# Patient Record
Sex: FEMALE | Race: WHITE | NOT HISPANIC OR LATINO | Employment: OTHER | URBAN - METROPOLITAN AREA
[De-identification: names, ages, dates, MRNs, and addresses within clinical notes are randomized per-mention and may not be internally consistent; named-entity substitution may affect disease eponyms.]

---

## 2017-01-31 ENCOUNTER — OFFICE VISIT (OUTPATIENT)
Dept: FAMILY MEDICINE | Facility: CLINIC | Age: 47
End: 2017-01-31
Payer: COMMERCIAL

## 2017-01-31 ENCOUNTER — RADIANT APPOINTMENT (OUTPATIENT)
Dept: GENERAL RADIOLOGY | Facility: CLINIC | Age: 47
End: 2017-01-31
Attending: PHYSICIAN ASSISTANT
Payer: COMMERCIAL

## 2017-01-31 VITALS
TEMPERATURE: 97.3 F | HEART RATE: 62 BPM | OXYGEN SATURATION: 100 % | DIASTOLIC BLOOD PRESSURE: 70 MMHG | WEIGHT: 131 LBS | BODY MASS INDEX: 23.21 KG/M2 | SYSTOLIC BLOOD PRESSURE: 120 MMHG

## 2017-01-31 DIAGNOSIS — M25.511 RIGHT SHOULDER PAIN, UNSPECIFIED CHRONICITY: Primary | ICD-10-CM

## 2017-01-31 PROCEDURE — 73030 X-RAY EXAM OF SHOULDER: CPT | Mod: RT

## 2017-01-31 PROCEDURE — 99213 OFFICE O/P EST LOW 20 MIN: CPT | Performed by: PHYSICIAN ASSISTANT

## 2017-01-31 NOTE — MR AVS SNAPSHOT
After Visit Summary   1/31/2017    Henna Higginbotham    MRN: [id number]           Patient Information     Date Of Birth          1970        Visit Information        Provider Department      1/31/2017 10:10 AM Bridget Alejandra PA-C [facility]        Today's Diagnoses     Right shoulder pain, unspecified chronicity    -  1       Care Instructions    Rest and ice the right shoulder multiple times daily for the next 2-3 days.     Perform gentle range of motion to prevent stiffness.     Then, slowly increase activities as tolerated. If something causes you pain, you are doing too much.    Alternate motrin and tylenol as needed for discomfort.    Follow up with physical therapy for further evaluation and treatment.     Schedule your MRI if the pain is persisting. If MRI shows a cause for your pain or your pain worsens, I will refer you to orthopedics.             Follow-ups after your visit        Additional Services     [facility] PT, HAND, AND CHIROPRACTIC REFERRAL       **This order will print in the [facility] Scheduling Office**    Physical Therapy, Hand Therapy and Chiropractic Care are available through:    *[facility]  *[facility]  *[facility]    Call one number to schedule at any of the above locations: [phone].    Your provider has referred you to: Physical Therapy at [facility] or [facility]    Indication/Reason for Referral: Shoulder Pain  Onset of Illness: 4-5 months  Therapy Orders: Evaluate and Treat  Special Programs: None  Special Request: None    Wan Blas      Additional Comments for the Therapist or Chiropractor: none    Please be aware that coverage of these services is subject to the terms and limitations of your health insurance plan.  Call member services at your health plan with any benefit or coverage questions.      Please bring the following to your appointment:    *Your personal calendar for scheduling future  "appointments  *Comfortable clothing                  Future tests that were ordered for you today     Open Future Orders        Priority Expected Expires Ordered    MR Shoulder Right w/o Contrast Routine  2018            Who to contact     If you have questions or need follow up information about today's clinic visit or your schedule please contact [facility] directly at [phone].  Normal or non-critical lab and imaging results will be communicated to you by MyChart, letter or phone within 4 business days after the clinic has received the results. If you do not hear from us within 7 days, please contact the clinic through [facility] or phone. If you have a critical or abnormal lab result, we will notify you by phone as soon as possible.  Submit refill requests through [employer] or call your pharmacy and they will forward the refill request to us. Please allow 3 business days for your refill to be completed.          Additional Information About Your Visit        Panda[employer] Information     [facility] lets you send messages to your doctor, view your test results, renew your prescriptions, schedule appointments and more. To sign up, go to [email or web address] . Click on \"Log in\" on the left side of the screen, which will take you to the Welcome page. Then click on \"Sign up Now\" on the right side of the page.     You will be asked to enter the access code listed below, as well as some personal information. Please follow the directions to create your username and password.     Your access code is: [email or web address]  Expires: 2017 10:39 AM     Your access code will  in 90 days. If you need help or a new code, please call your [facility] or [phone].        Care EveryWhere ID     This is your Care EveryWhere ID. This could be used by other organizations to access your [facility] medical records  [id number]        Your Vitals Were     Pulse Temperature Pulse Oximetry             62 " 97.3  F (36.3  C) (Oral) 100%          Blood Pressure from Last 3 Encounters:   01/31/17 120/70   12/19/16 116/70   12/01/14 110/69    Weight from Last 3 Encounters:   01/31/17 131 lb (59.421 kg)   12/19/16 132 lb 3.2 oz (59.966 kg)   12/01/14 126 lb (57.153 kg)              We Performed the Following     [facility] PT, HAND, AND CHIROPRACTIC REFERRAL     XR Shoulder Right G/E 3 Views        Primary Care Provider Office Phone # Fax #    Ra Mack [phone]       [facility] [address] [facility] [address]        Thank you!     Thank you for choosing [facility]  for your care. Our goal is always to provide you with excellent care. Hearing back from our patients is one way we can continue to improve our services. Please take a few minutes to complete the written survey that you may receive in the mail after your visit with us. Thank you!             Your Updated Medication List - Protect others around you: Learn how to safely use, store and throw away your medicines at www.disposemymeds.org.          This list is accurate as of: 1/31/17 10:39 AM.  Always use your most recent med list.                   Brand Name Dispense Instructions for use    ALPRAZolam 0.5 MG tablet    XANAX    30 tablet    Take 1 tablet (0.5 mg) by mouth 3 times daily as needed for anxiety       levonorgestrel 20 MCG/24HR IUD    MIRENA     1 each by Intrauterine route once       phenazopyridine 200 MG tablet    PYRIDIUM    6 tablet    Take 1 tablet (200 mg) by mouth 3 times daily as needed       rizatriptan 10 MG tablet    MAXALT    30 tablet    Take 1 tablet (10 mg) by mouth at onset of headache for migraine MAY REPEAT IN 2 HOURS IF NEEDED       sulfamethoxazole-trimethoprim 800-160 MG per tablet    BACTRIM DS    14 tablet    Take 1 tablet by mouth 2 times daily For bladder infections

## 2017-01-31 NOTE — PROGRESS NOTES
SUBJECTIVE:                                                    Henna Higginbotham is a 46 year old female who presents to clinic today for the following health issues:    Shoulder Pain     Onset: 4- 5 months     Description:   Location: right shoulder  Character: Sharp pain with movement, painful ache.     Intensity: moderate    Progression of Symptoms: worse    Accompanying Signs & Symptoms:  Other symptoms: Unable to lift without pain, ex. Brushing/blow drying hair, lifting coffee cup.   History:   Previous similar pain: no       Precipitating factors:   Trauma or overuse: no     Alleviating factors:  Improved by: nothing       Therapies Tried and outcome: Rest, stretching, chiropractor.       Pain has slowly progressed. She did take 6-8 weeks off of lifting weights. This did not improve her symptoms. She has tried stretching and muscle stimulation. Locates pain within the joint. Most pain with abduction or any range of motion above her head. She has good range of motion. Denies numbness and tingling. She is right hand dominant.     Problem list and histories reviewed & adjusted, as indicated.  Additional history: as documented    Patient Active Problem List   Diagnosis     Anxiety state     CARDIOVASCULAR SCREENING; LDL GOAL LESS THAN 160     Migraines     Past Surgical History   Procedure Laterality Date     Hc enlarge breast with implant  2004       Social History   Substance Use Topics     Smoking status: Never Smoker      Smokeless tobacco: Never Used     Alcohol Use: Yes      Comment: occ.     Family History   Problem Relation Age of Onset     Breast Cancer Mother      CANCER Mother      skin     CANCER Father      kidney     Hypertension Father      Lipids Father      CANCER Maternal Grandmother      ovarian     DIABETES Maternal Grandfather      CANCER Maternal Grandfather      skin     Arthritis Brother      rheumatoid         Current Outpatient Prescriptions   Medication Sig Dispense Refill      phenazopyridine (PYRIDIUM) 200 MG tablet Take 1 tablet (200 mg) by mouth 3 times daily as needed 6 tablet 11     rizatriptan (MAXALT) 10 MG tablet Take 1 tablet (10 mg) by mouth at onset of headache for migraine MAY REPEAT IN 2 HOURS IF NEEDED 30 tablet 0     ALPRAZolam (XANAX) 0.5 MG tablet Take 1 tablet (0.5 mg) by mouth 3 times daily as needed for anxiety 30 tablet 3     sulfamethoxazole-trimethoprim (BACTRIM DS) 800-160 MG per tablet Take 1 tablet by mouth 2 times daily For bladder infections 14 tablet 11     levonorgestrel (MIRENA) 20 MCG/24HR IUD 1 each by Intrauterine route once       No Known Allergies  BP Readings from Last 3 Encounters:   01/31/17 120/70   12/19/16 116/70   12/01/14 110/69    Wt Readings from Last 3 Encounters:   01/31/17 131 lb (59.421 kg)   12/19/16 132 lb 3.2 oz (59.966 kg)   12/01/14 126 lb (57.153 kg)                  Problem list, Medication list, Allergies, and Medical/Social/Surgical histories reviewed in [facility] and updated as appropriate.    ROS:  Constitutional, musculoskeletal and integumentary systems are negative, except as otherwise noted.    OBJECTIVE:                                                    /70 mmHg  Pulse 62  Temp(Src) 97.3  F (36.3  C) (Oral)  Wt 131 lb (59.421 kg)  SpO2 100%  Body mass index is 23.21 kg/(m^2).  GENERAL: healthy, alert and no distress  MS: Right shoulder -no tenderness to palpation, normal range of motion with mild pain especially with flexion and abduction past 90 degrees, no swelling, no skin changes, normal sensation and capillary refill  SKIN: no suspicious lesions or rashes    Diagnostic Test Results:  Xray of right shoulder- unremarkable. Radiology read is pending     ASSESSMENT/PLAN:                                                        ICD-10-CM    1. Right shoulder pain, unspecified chronicity M25.511 XR Shoulder Right G/E 3 Views     MR Shoulder Right w/o Contrast     [facility] PT, HAND, AND CHIROPRACTIC REFERRAL     CANCELED: XR  Shoulder Right 2 Views     Concerns for strain vs partial tear of rotator cuff. Will order physical therapy and MRI if pain is not improving.     Patient Instructions   Rest and ice the right shoulder multiple times daily for the next 2-3 days.     Perform gentle range of motion to prevent stiffness.     Then, slowly increase activities as tolerated. If something causes you pain, you are doing too much.    Alternate motrin and tylenol as needed for discomfort.    Follow up with physical therapy for further evaluation and treatment.     Schedule your MRI if the pain is persisting. If MRI shows a cause for your pain or your pain worsens, I will refer you to orthopedics.             Bridget Alejandra PA-C  [facility]

## 2017-01-31 NOTE — PATIENT INSTRUCTIONS
Rest and ice the right shoulder multiple times daily for the next 2-3 days.     Perform gentle range of motion to prevent stiffness.     Then, slowly increase activities as tolerated. If something causes you pain, you are doing too much.    Alternate motrin and tylenol as needed for discomfort.    Follow up with physical therapy for further evaluation and treatment.     Schedule your MRI if the pain is persisting. If MRI shows a cause for your pain or your pain worsens, I will refer you to orthopedics.

## 2017-01-31 NOTE — Clinical Note
[facility]  [address] [facility] [address] [phone]        January 31, 2017    Henna Higginbotham  [address] [address] [facility] [address]            Dear Henna,    The results of your recent tests were normal.  Below is a copy of the results.  It was a pleasure to see you at your last appointment.    If you have any questions or concerns, please call myself or my nurse at [phone].    Sincerely,    Bridget Alejandra PA-C/catalino    Results for orders placed or performed in visit on 01/31/17   XR Shoulder Right G/E 3 Views    Narrative    RIGHT SHOULDER 3 VIEWS  1/31/2017 10:35 AM    HISTORY:  Pain in right shoulder.    COMPARISON:  None.    FINDINGS:  No fracture or osseous lesion is seen. The joint spaces are  well preserved. No adjacent soft tissue pathology is seen.      Impression    IMPRESSION:  Unremarkable examination.    BIB ONOFRE MD

## 2017-01-31 NOTE — NURSING NOTE
"Chief Complaint   Patient presents with     Shoulder Pain       Mask not indicated for this appointment.     Initial /70 mmHg  Pulse 62  Temp(Src) 97.3  F (36.3  C) (Oral)  Wt 131 lb (59.421 kg)  SpO2 100% Estimated body mass index is 23.21 kg/(m^2) as calculated from the following:    Height as of 12/19/16: 5' 3\" (1.6 m).    Weight as of this encounter: 131 lb (59.421 kg)..  BP completed using cuff size: carlos enrique Magallanes MA    "

## 2017-02-04 ENCOUNTER — THERAPY VISIT (OUTPATIENT)
Dept: PHYSICAL THERAPY | Facility: CLINIC | Age: 47
End: 2017-02-04
Payer: COMMERCIAL

## 2017-02-04 DIAGNOSIS — G89.29 CHRONIC RIGHT SHOULDER PAIN: Primary | ICD-10-CM

## 2017-02-04 DIAGNOSIS — M25.511 CHRONIC RIGHT SHOULDER PAIN: Primary | ICD-10-CM

## 2017-02-04 PROCEDURE — 97110 THERAPEUTIC EXERCISES: CPT | Mod: GP | Performed by: PHYSICAL THERAPIST

## 2017-02-04 PROCEDURE — 97161 PT EVAL LOW COMPLEX 20 MIN: CPT | Mod: GP | Performed by: PHYSICAL THERAPIST

## 2017-02-04 PROCEDURE — 97112 NEUROMUSCULAR REEDUCATION: CPT | Mod: GP | Performed by: PHYSICAL THERAPIST

## 2017-02-04 NOTE — PROGRESS NOTES
[facility] Initial Evaluation    Subjective:    Henna Higginbotham is a 46 year old female with a right shoulder condition.  Condition occurred with:  Unknown cause.  Condition occurred: for unknown reasons.  This is a new condition  Sept 2016  Had been strength training 2 x week but no longer doing upper extremity strengthening. .    Patient reports pain:  In the joint.    Pain is described as aching and sharp and is intermittent Pain Scale: 2-6/10.   Pain is the same all the time.  Symptoms are exacerbated by using arm overhead, lying on extremity, lifting, using arm at shoulder level and using arm behind back Relieved by: avoid what hurts.  Since onset symptoms are gradually worsening.  Special testing: MRI scheduled next weekend.  Previous treatment includes chiropractic.  There was no improvement following previous treatment.  General health as reported by patient is excellent.  Pertinent medical history includes:  Migraines.  Medical allergies: no.  Other surgeries include:  None reported.  Current medications:  None as reported by the patient.  Current occupation is executive.  Patient is working in normal job without restrictions.  Primary job tasks include:  Prolonged sitting (computer work).    Barriers include:  None as reported by the patient.    Red flags:  None as reported by the patient.                      Objective:    Standing Alignment:      Shoulder/UE:  Rounded shoulders, protracted scapula L and protracted scapula R                  Flexibility/Screens:   Positive screens:  ShoulderNegative screens: Cervical   Upper Extremity:    Decreased left upper extremity flexibility at:  Pectoralis Major and Pectoralis Minor    Decreased right upper extremity flexibility present at:  Pectoralis Major and Pectoralis Minor                           Shoulder Evaluation:  ROM:  AROM:    Flexion:  Left:  155    Right:  155    Abduction:  Left: 160   Right:   170                Flexion/External Rotation:  Left:  T3    Right:  T3  Extension/Internal Rotation:  Left:  T3    Right:  T6    PROM:    Flexion:  Right: 160+      Abduction:  Right:  170+    Internal Rotation:  Right:  60 sidely  External Rotation:  Right:  90                    Strength:  normal (Fair scapular stability)                        Stability Testing:        Right shoulder stability negative testing:  Apprehension and Relocation  Special Tests:  Special tests assessed shoulder: +Nelson's RIght.    Right shoulder positive for the following special tests:Labral and Impingement  Right shoulder negative for the following special tests:Bursal; Neural Tension; Rotator cuff tear and Acrimioclavicular  Palpation:  normal      Mobility Tests:  normal                                                   General     ROS    Assessment/Plan:      Patient is a 46 year old female with right side shoulder complaints.    Patient has the following significant findings with corresponding treatment plan.                  Diagnosis 1:  Right Shoulder Impingement (suspect labral tear)      ain -  manual therapy, self management, education, home program and modalities PRN  Decreased strength - therapeutic exercise, therapeutic activities and home program  Decreased proprioception - neuro re-education and therapeutic activities  Impaired muscle performance - neuro re-education and home program  Decreased function - therapeutic activities and home program  Impaired posture - neuro re-education, therapeutic activities and home program    Therapy Evaluation Codes:   1) History comprised of:   Personal factors that impact the plan of care:      None.    Comorbidity factors that impact the plan of care are:      None.     Medications impacting care: None.  2) Examination of Body Systems comprised of:   Body structures and functions that impact the plan of care:      Shoulder.   Activity limitations that impact the plan of care are:       Bathing, Dressing, Lifting, Sports, Throwing and Sleeping.  3) Clinical presentation characteristics are:   Stable/Uncomplicated.  4) Decision-Making    Low complexity using standardized patient assessment instrument and/or measureable assessment of functional outcome.  Cumulative Therapy Evaluation is: Low complexity.    Previous and current functional limitations:  (See Goal Flow Sheet for this information)    Short term and Long term goals: (See Goal Flow Sheet for this information)     Communication ability:  Patient appears to be able to clearly communicate and understand verbal and written communication and follow directions correctly.  Treatment Explanation - The following has been discussed with the patient:   RX ordered/plan of care  Anticipated outcomes  Possible risks and side effects  This patient would benefit from PT intervention to resume normal activities.   Rehab potential is good.    Frequency:  1 X week, once daily  Duration:  for 6 weeks  Discharge Plan:  Achieve all LTG.  Independent in home treatment program.  Return to previous functional level by discharge.  Reach maximal therapeutic benefit.    Please refer to the daily flowsheet for treatment today, total treatment time and time spent performing 1:1 timed codes.

## 2017-02-11 ENCOUNTER — RADIANT APPOINTMENT (OUTPATIENT)
Dept: MRI IMAGING | Facility: CLINIC | Age: 47
End: 2017-02-11
Attending: PHYSICIAN ASSISTANT
Payer: COMMERCIAL

## 2017-02-11 DIAGNOSIS — M25.511 RIGHT SHOULDER PAIN, UNSPECIFIED CHRONICITY: ICD-10-CM

## 2017-02-11 PROCEDURE — 73221 MRI JOINT UPR EXTREM W/O DYE: CPT | Mod: TC

## 2017-02-13 ENCOUNTER — THERAPY VISIT (OUTPATIENT)
Dept: PHYSICAL THERAPY | Facility: CLINIC | Age: 47
End: 2017-02-13

## 2017-02-13 DIAGNOSIS — G89.29 CHRONIC RIGHT SHOULDER PAIN: ICD-10-CM

## 2017-02-13 DIAGNOSIS — M25.511 CHRONIC RIGHT SHOULDER PAIN: ICD-10-CM

## 2017-02-17 ENCOUNTER — TELEPHONE (OUTPATIENT)
Dept: FAMILY MEDICINE | Facility: CLINIC | Age: 47
End: 2017-02-17

## 2017-02-17 DIAGNOSIS — M25.511 RIGHT SHOULDER PAIN, UNSPECIFIED CHRONICITY: Primary | ICD-10-CM

## 2017-02-17 NOTE — TELEPHONE ENCOUNTER
Henna,   Attached are the results from your right shoulder MRI. There is no tear of the rotator cuff. There is mild tendinosis, chronic form of tendonitis without the inflammation, of the supraspinatus tendon. This is most likely due to overuse and is likely causing your symptoms. There is also mild subacromial edema, which could be related to a mild bursitis. I would continue with physical therapy. If you would like to see an orthopedist, I would be happy to place the referral. Please contact the clinic if you have any questions. Thank you.     Brigdet Alejandra PA-C       Left message on answering machine for patient to call back.  Christi TRUJILLON, RN, CPN

## 2017-02-17 NOTE — TELEPHONE ENCOUNTER
Patient informed of result note as below. Patient verbalized understanding. Patient would like referral to ortho. Patient would also like results mailed to her. .Christi TRUJILLON, RN, CPN

## 2017-02-17 NOTE — TELEPHONE ENCOUNTER
LM for pt letting her know scheduling number for referral as well as let her know a copy of her results and referral mailed to her address.    Janice Dye,

## 2017-02-17 NOTE — TELEPHONE ENCOUNTER
Reason for Call:  Request for results:    Name of test or procedure: MRI    Date of test of procedure: 02/11/2017    Location of the test or procedure: [facility] to leave the result message on voice mail or with a family member? YES    Phone number Patient can be reached at:  Home number on file [phone] (home)    Additional comments:     Call taken on 2/17/2017 at 10:23 AM by Carmen Levy

## 2017-02-17 NOTE — LETTER
[facility]  [address] [facility] [address] [phone]        February 17, 2017    Henna Higginbotham  [address] [facility] [address]            Dear Henna,    Kira are the results from your right shoulder MRI. There is no tear of the rotator cuff. There is mild tendinosis, chronic form of tendonitis without the inflammation, of the supraspinatus tendon. This is most likely due to overuse and is likely causing your symptoms. There is also mild subacromial edema, which could be related to a mild bursitis. I would continue with physical therapy. If you would like to see an orthopedist, I would be happy to place the referral. Please contact the clinic if you have any questions. Thank you. It was a pleasure to see you at your last appointment.    If you have any questions or concerns, please call myself or my nurse at [phone].    Sincerely,    Bridget Alejandra PA-C/ks    Results for orders placed or performed in visit on 02/11/17   MR Shoulder Right w/o Contrast    Narrative    MR RIGHT SHOULDER WITHOUT CONTRAST  2/11/2017 9:07 AM    HISTORY:  Right shoulder pain.    TECHNIQUE:  Coronal oblique T1, T2, and fat suppressed T2, sagittal  oblique T2, and transverse proton density and T2 weighted images.    COMPARISON: X-ray from 1/31/2017.    FINDINGS:   Osseous acromial outlet: There is a Type I subacromial  configuration.No anterior or lateral downsloping.  No subacromial  spur.  Acromioclavicular joint is normal and does not narrow the  supraspinatus outlet.    Rotator cuff: Mild tendinosis distal supraspinatus tendon. No evidence  of rotator cuff tear.  No focal muscle atrophy.    Labral Structures: The glenoid labrum appears within normal limits. No  apparent SLAP lesion. No paralabral cysts.    Biceps Tendon: No long head biceps tendon tear, subluxation, or  tendinosis.    Osseous structures: Mild resorptive cysts greater tuberosity region.   Bone marrow signal is otherwise  normal.  No apparent chondromalacia.    Joint space: No glenohumeral joint effusion.    Additional findings: Small amount of subacromial edema.  No deltoid  muscle edema or tear.      Impression    IMPRESSION:   1. Mild tendinosis distal supraspinatus tendon.  2. Mild subacromial edema could be related to mild subacromial  bursitis.    JENNIFER BARAJAS MD

## 2017-02-17 NOTE — TELEPHONE ENCOUNTER
Referral pended, please complete and sign route back to [facility] for TC to mail results and referral info.    Janice Dye,

## 2017-03-21 PROBLEM — M25.511 CHRONIC RIGHT SHOULDER PAIN: Status: RESOLVED | Noted: 2017-02-04 | Resolved: 2017-03-21

## 2017-03-21 PROBLEM — G89.29 CHRONIC RIGHT SHOULDER PAIN: Status: RESOLVED | Noted: 2017-02-04 | Resolved: 2017-03-21

## 2017-04-06 ENCOUNTER — OFFICE VISIT (OUTPATIENT)
Dept: FAMILY MEDICINE | Facility: CLINIC | Age: 47
End: 2017-04-06
Payer: COMMERCIAL

## 2017-04-06 VITALS
BODY MASS INDEX: 23.03 KG/M2 | OXYGEN SATURATION: 100 % | HEART RATE: 95 BPM | WEIGHT: 130 LBS | SYSTOLIC BLOOD PRESSURE: 129 MMHG | DIASTOLIC BLOOD PRESSURE: 81 MMHG

## 2017-04-06 DIAGNOSIS — F41.1 ANXIETY STATE: Primary | ICD-10-CM

## 2017-04-06 DIAGNOSIS — R30.0 DYSURIA: ICD-10-CM

## 2017-04-06 PROCEDURE — 99213 OFFICE O/P EST LOW 20 MIN: CPT | Performed by: FAMILY MEDICINE

## 2017-04-06 RX ORDER — PHENAZOPYRIDINE HYDROCHLORIDE 200 MG/1
200 TABLET, FILM COATED ORAL 3 TIMES DAILY PRN
Qty: 6 TABLET | Refills: 11 | Status: SHIPPED | OUTPATIENT
Start: 2017-04-06 | End: 2018-01-04

## 2017-04-06 RX ORDER — ALPRAZOLAM 0.5 MG
0.5 TABLET ORAL 3 TIMES DAILY PRN
Qty: 30 TABLET | Refills: 5 | Status: SHIPPED | OUTPATIENT
Start: 2017-04-06 | End: 2018-01-04

## 2017-04-06 RX ORDER — SULFAMETHOXAZOLE AND TRIMETHOPRIM 400; 80 MG/1; MG/1
1 TABLET ORAL 2 TIMES DAILY
Qty: 20 TABLET | Refills: 11 | Status: SHIPPED | OUTPATIENT
Start: 2017-04-06 | End: 2018-01-04

## 2017-04-06 NOTE — MR AVS SNAPSHOT
"              After Visit Summary   2017    Henna Higginbotham    MRN: [id number]           Patient Information     Date Of Birth          1970        Visit Information        Provider Department      2017 1:30 PM Ra Mack MD [facility]        Today's [facility]     Anxiety state    -  1    Dysuria           Follow-ups after your visit        Who to contact     If you have questions or need follow up information about today's clinic visit or your schedule please contact [facility] directly at [phone].  Normal or non-critical lab and imaging results will be communicated to you by MyChart, letter or phone within 4 business days after the clinic has received the results. If you do not hear from us within 7 days, please contact the clinic through [employer] or phone. If you have a critical or abnormal lab result, we will notify you by phone as soon as possible.  Submit refill requests through [employer] or call your pharmacy and they will forward the refill request to us. Please allow 3 business days for your refill to be completed.          Additional Information About Your Visit        MyChart Information     [employer] lets you send messages to your doctor, view your test results, renew your prescriptions, schedule appointments and more. To sign up, go to [email or web address] . Click on \"Log in\" on the left side of the screen, which will take you to the Welcome page. Then click on \"Sign up Now\" on the right side of the page.     You will be asked to enter the access code listed below, as well as some personal information. Please follow the directions to create your username and password.     Your access code is: [email or web address]  Expires: 2017 11:39 AM     Your access code will  in 90 days. If you need help or a new code, please call your [facility] or [phone].        Care EveryWhere ID     This is your Care EveryWhere ID. This could be used by other " organizations to access your [place] medical records  [id number]        Your Vitals Were     Pulse Pulse Oximetry BMI (Body Mass Index)             95 100% 23.03 kg/m2          Blood Pressure from Last 3 Encounters:   04/06/17 129/81   01/31/17 120/70   12/19/16 116/70    Weight from Last 3 Encounters:   04/06/17 130 lb (59 kg)   01/31/17 131 lb (59.4 kg)   12/19/16 132 lb 3.2 oz (60 kg)              Today, you had the following     No orders found for display         Today's Medication Changes          These changes are accurate as of: 4/6/17  2:01 PM.  If you have any questions, ask your nurse or doctor.               These medicines have changed or have updated prescriptions.        Dose/Directions    * sulfamethoxazole-trimethoprim 800-160 MG per tablet   Commonly known as:  BACTRIM DS   This may have changed:  Another medication with the same name was added. Make sure you understand how and when to take each.   Used for:  Dysuria   Changed by:  Ra Mack MD        Dose:  1 tablet   Take 1 tablet by mouth 2 times daily For bladder infections   Quantity:  14 tablet   Refills:  11       * sulfamethoxazole-trimethoprim 400-80 MG per tablet   Commonly known as:  BACTRIM/SEPTRA   This may have changed:  You were already taking a medication with the same name, and this prescription was added. Make sure you understand how and when to take each.   Used for:  Dysuria   Changed by:  Ra Mack MD        Dose:  1 tablet   Take 1 tablet by mouth 2 times daily   Quantity:  20 tablet   Refills:  11       * Notice:  This list has 2 medication(s) that are the same as other medications prescribed for you. Read the directions carefully, and ask your doctor or other care provider to review them with you.         Where to get your medicines      These medications were sent to [facility]/pharmacy #[id number] - [facility] [address] [place] [address],  AT CORNER OF [facility]  [address] [place] [address], ,  [facility] [address]     Phone:  [phone]     phenazopyridine 200 MG tablet    sulfamethoxazole-trimethoprim 400-80 MG per tablet         Some of these will need a paper prescription and others can be bought over the counter.  Ask your nurse if you have questions.     Bring a paper prescription for each of these medications     ALPRAZolam 0.5 MG tablet                Primary Care Provider Office Phone # Fax #    Ra Mack [phone]       [facility] [address] [address] [facility] [address]        Thank you!     Thank you for choosing [facility]  for your care. Our goal is always to provide you with excellent care. Hearing back from our patients is one way we can continue to improve our services. Please take a few minutes to complete the written survey that you may receive in the mail after your visit with us. Thank you!             Your Updated Medication List - Protect others around you: Learn how to safely use, store and throw away your medicines at www.disposemymeds.org.          This list is accurate as of: 4/6/17  2:01 PM.  Always use your most recent med list.                   Brand Name Dispense Instructions for use    ALPRAZolam 0.5 MG tablet    XANAX    30 tablet    Take 1 tablet (0.5 mg) by mouth 3 times daily as needed for anxiety       levonorgestrel 20 MCG/24HR IUD    MIRENA     1 each by Intrauterine route once       phenazopyridine 200 MG tablet    PYRIDIUM    6 tablet    Take 1 tablet (200 mg) by mouth 3 times daily as needed       rizatriptan 10 MG tablet    MAXALT    30 tablet    Take 1 tablet (10 mg) by mouth at onset of headache for migraine MAY REPEAT IN 2 HOURS IF NEEDED       * sulfamethoxazole-trimethoprim 800-160 MG per tablet    BACTRIM DS    14 tablet    Take 1 tablet by mouth 2 times daily For bladder infections       * sulfamethoxazole-trimethoprim 400-80 MG per tablet    BACTRIM/SEPTRA    20 tablet    Take 1 tablet by mouth 2 times  daily       * Notice:  This list has 2 medication(s) that are the same as other medications prescribed for you. Read the directions carefully, and ask your doctor or other care provider to review them with you.

## 2017-04-06 NOTE — PROGRESS NOTES
SUBJECTIVE:  SUBJECTIVE:  47 year old.The patient has a history of  follow-up of anxiety symptoms.    Since last visit the patient has changing jobs and will be unemployed for 6 months.  Current symptoms include: Anxiety   Symptoms that have subjectively improved include: Anxiety  Previous and current treatment modalities employed include: Medications   xanax  Patient Active Problem List   Diagnosis     Anxiety state     CARDIOVASCULAR SCREENING; LDL GOAL LESS THAN 160     Migraines      Reviewed health maintenance  OBJECTIVE:  no apparent distress  /81  Pulse 95  Wt 130 lb (59 kg)  SpO2 100%  BMI 23.03 kg/m2       MENTAL STATUS EXAM:   1. Clinical observations:Patient was clean and was adequately groomed. Patient's emotional presentation was cooperative and tamra/open. Patient spoke clearly and articulately. Patient maintained adequate eye contact and was cooperative in answering questions.  2. Patient appeared to be well-oriented in all spheres with coherent, logical, goal directed and relevent thinking.  3. Thought content: Denies auditory and visual hallucinations or delusions.  4. Affect and mood: Affect is agitated and her emotional attitude is described as normal.  5. Sensorium and cognition: Patient was in contact with reality and oriented to place, time, person and situation. patient demonstrated no impairment in immediate, recent, or remote memory. patient's insight was adequate without obvious deficits in intelligence.        SUBJECTIVE: patient has history of recurrent bladder infections especially when she travels    ICD-10-CM    1. Anxiety state F41.1    2. Dysuria R30.0 ALPRAZolam (XANAX) 0.5 MG tablet     phenazopyridine (PYRIDIUM) 200 MG tablet     sulfamethoxazole-trimethoprim (BACTRIM/SEPTRA) 400-80 MG per tablet    PLAN:Follow up in 6 months       :

## 2017-06-07 ENCOUNTER — TELEPHONE (OUTPATIENT)
Dept: FAMILY MEDICINE | Facility: CLINIC | Age: 47
End: 2017-06-07

## 2017-06-07 NOTE — TELEPHONE ENCOUNTER
Reason for call:  Other     Patient called regarding  immunizations    Additional comments: wants to know if immunizations are up to date      Phone number to reach patient:  Home number on file [phone] (home)    Best Time:        Can we leave a detailed message on this number?  YES

## 2017-06-07 NOTE — TELEPHONE ENCOUNTER
Patient is due for a Tdap.  Please call patient and help schedule an ancillary appointment for this if interested.   Tone Johnson CMA

## 2017-06-08 ENCOUNTER — ALLIED HEALTH/NURSE VISIT (OUTPATIENT)
Dept: NURSING | Facility: CLINIC | Age: 47
End: 2017-06-08
Payer: COMMERCIAL

## 2017-06-08 DIAGNOSIS — Z23 NEED FOR VACCINATION: Primary | ICD-10-CM

## 2017-06-08 PROCEDURE — 99207 ZZC NO CHARGE NURSE ONLY: CPT

## 2017-06-08 PROCEDURE — 90715 TDAP VACCINE 7 YRS/> IM: CPT

## 2017-06-08 PROCEDURE — 90471 IMMUNIZATION ADMIN: CPT

## 2017-06-08 NOTE — NURSING NOTE
Screening Questionnaire for Adult Immunization    Are you sick today?   No   Do you have allergies to medications, food, a vaccine component or latex?   No   Have you ever had a serious reaction after receiving a vaccination?   No   Do you have a long-term health problem with heart disease, lung disease, asthma, kidney disease, metabolic disease (e.g. diabetes), anemia, or other blood disorder?   No   Do you have cancer, leukemia, HIV/AIDS, or any other immune system problem?   No   In the past 3 months, have you taken medications that affect  your immune system, such as prednisone, other steroids, or anticancer drugs; drugs for the treatment of rheumatoid arthritis, Crohn s disease, or psoriasis; or have you had radiation treatments?   No   Have you had a seizure, or a brain or other nervous system problem?   No   During the past year, have you received a transfusion of blood or blood     products, or been given immune (gamma) globulin or antiviral drug?   No   For women: Are you pregnant or is there a chance you could become        pregnant during the next month?   No   Have you received any vaccinations in the past 4 weeks?   No     Immunization questionnaire answers were all negative.      MNVFC doesn't apply on this patient    Per orders of Dr. DODD, injection of Tdap given by Priti Tompkins. Patient instructed to remain in clinic for 20 minutes afterwards, and to report any adverse reaction to me immediately.       Screening performed by Priti Tompkins on 6/8/2017 at 8:58 AM.

## 2017-06-08 NOTE — MR AVS SNAPSHOT
"              After Visit Summary   2017    Henna Higginbotham    MRN: [id number]           Patient Information     Date Of Birth          1970        Visit Information        Provider Department      2017 8:30 AM AN ANCILLARY [facility]        Today's Diagnoses     Need for vaccination    -  1       Follow-ups after your visit        Who to contact     If you have questions or need follow up information about today's clinic visit or your schedule please contact [facility] directly at [phone].  Normal or non-critical lab and imaging results will be communicated to you by MyChart, letter or phone within 4 business days after the clinic has received the results. If you do not hear from us within 7 days, please contact the clinic through U[employer] or phone. If you have a critical or abnormal lab result, we will notify you by phone as soon as possible.  Submit refill requests through [employer] or call your pharmacy and they will forward the refill request to us. Please allow 3 business days for your refill to be completed.          Additional Information About Your Visit        MyChart Information     [employer] lets you send messages to your doctor, view your test results, renew your prescriptions, schedule appointments and more. To sign up, go to [email or web address] . Click on \"Log in\" on the left side of the screen, which will take you to the Welcome page. Then click on \"Sign up Now\" on the right side of the page.     You will be asked to enter the access code listed below, as well as some personal information. Please follow the directions to create your username and password.     Your access code is: [email or web address]  Expires: 2017  9:01 AM     Your access code will  in 90 days. If you need help or a new code, please call your [facility] or [phone].        Care EveryWhere ID     This is your Care EveryWhere ID. This could be used by other organizations to " access your [place] medical records  [id number]         Blood Pressure from Last 3 Encounters:   04/06/17 129/81   01/31/17 120/70   12/19/16 116/70    Weight from Last 3 Encounters:   04/06/17 130 lb (59 kg)   01/31/17 131 lb (59.4 kg)   12/19/16 132 lb 3.2 oz (60 kg)              We Performed the Following     1st  Administration  [90789]     TDAP VACCINE (ADACEL) [76465.002]        Primary Care Provider Office Phone # Fax #    Ra Chuy Mack [phone]       [facility] [address] [facility] [address]        Thank you!     Thank you for choosing [facility]  for your care. Our goal is always to provide you with excellent care. Hearing back from our patients is one way we can continue to improve our services. Please take a few minutes to complete the written survey that you may receive in the mail after your visit with us. Thank you!             Your Updated Medication List - Protect others around you: Learn how to safely use, store and throw away your medicines at www.disposemymeds.org.          This list is accurate as of: 6/8/17  9:01 AM.  Always use your most recent med list.                   Brand Name Dispense Instructions for use    ALPRAZolam 0.5 MG tablet    XANAX    30 tablet    Take 1 tablet (0.5 mg) by mouth 3 times daily as needed for anxiety       levonorgestrel 20 MCG/24HR IUD    MIRENA     1 each by Intrauterine route once       phenazopyridine 200 MG tablet    PYRIDIUM    6 tablet    Take 1 tablet (200 mg) by mouth 3 times daily as needed       rizatriptan 10 MG tablet    MAXALT    30 tablet    Take 1 tablet (10 mg) by mouth at onset of headache for migraine MAY REPEAT IN 2 HOURS IF NEEDED       * sulfamethoxazole-trimethoprim 800-160 MG per tablet    BACTRIM DS    14 tablet    Take 1 tablet by mouth 2 times daily For bladder infections       * sulfamethoxazole-trimethoprim 400-80 MG per tablet    BACTRIM/SEPTRA    20 tablet    Take 1  tablet by mouth 2 times daily       * Notice:  This list has 2 medication(s) that are the same as other medications prescribed for you. Read the directions carefully, and ask your doctor or other care provider to review them with you.

## 2017-06-28 ENCOUNTER — TELEPHONE (OUTPATIENT)
Dept: FAMILY MEDICINE | Facility: CLINIC | Age: 47
End: 2017-06-28

## 2017-06-28 DIAGNOSIS — H10.31 ACUTE CONJUNCTIVITIS OF RIGHT EYE: Primary | ICD-10-CM

## 2017-06-28 RX ORDER — POLYMYXIN B SULFATE AND TRIMETHOPRIM 1; 10000 MG/ML; [USP'U]/ML
1 SOLUTION OPHTHALMIC EVERY 4 HOURS
Qty: 1 BOTTLE | Refills: 0 | Status: SHIPPED | OUTPATIENT
Start: 2017-06-28 | End: 2018-02-01

## 2017-06-28 NOTE — TELEPHONE ENCOUNTER
Patient is calling stating in [place] for a week and developed conjunctivitis in right eye, red, goopy and not getting better. Has been using visine and not helping, wondering if provider could call in antibiotic eye drop. pelase call to advise. Patient would like to use [employer] in [facility] in shopping center: [place]. Thank you.

## 2017-06-28 NOTE — TELEPHONE ENCOUNTER
RN Conjunctivitis Protocol: Ages 2 and older  Henna Higginbotham is a 47 year old female is having symptoms reviewed for possible conjunctivitis.      ASSESSMENT/PLAN:  Allergy to Sulfa?  No   1.  Medication Indicated: YES - POLYTRIM 43544-4.1 UNIT/ML-% OP Sol, 1 drop in affected eye(s) 4 times daily while awake x 7 days. .    2.  Education regarding contact precautions, hand washing, avoid wearing contacts until finished with drops or until symptoms resolve, contact clinic if there is no improvement of symptoms within 3 days and if develops eye pain or sensitivity to light.   3.  Follow-up: Schedule an appointment with a provider if symptoms do not improve after 3 days of antibiotics or if symptoms return after antibiotic therapy is complete.  4.  Patient verbalized understanding of this plan and is agreeable.    SUBJECTIVE:     Conjunctival symptoms: redness, mattering (yellow/green) and itching   Location: right eye  Onset: 4 days ago  In addition notes: None  Contact Lens use?: Yes; clean or dispose of current contacts, no contact use for 24 hrs from initiation of antibiotic therapy  Complicating factors    Reports:NONE  Denies:Vision changes; not cleared with blinking, Recent  history of eye trauma, Sensitivity to light, Severe eye pain, Fever: none, Eyelid symptoms None      OBJECTIVE:     Encounter handled by: Nurse Triage.     Sheri Hightower RN

## 2018-01-04 ENCOUNTER — OFFICE VISIT (OUTPATIENT)
Dept: FAMILY MEDICINE | Facility: CLINIC | Age: 48
End: 2018-01-04
Payer: COMMERCIAL

## 2018-01-04 VITALS
BODY MASS INDEX: 23.56 KG/M2 | TEMPERATURE: 98.4 F | WEIGHT: 133 LBS | DIASTOLIC BLOOD PRESSURE: 73 MMHG | SYSTOLIC BLOOD PRESSURE: 119 MMHG | HEART RATE: 73 BPM | OXYGEN SATURATION: 99 %

## 2018-01-04 DIAGNOSIS — F41.1 ANXIETY STATE: ICD-10-CM

## 2018-01-04 DIAGNOSIS — Z13.21 ENCOUNTER FOR VITAMIN DEFICIENCY SCREENING: Primary | ICD-10-CM

## 2018-01-04 DIAGNOSIS — G43.819 OTHER MIGRAINE WITHOUT STATUS MIGRAINOSUS, INTRACTABLE: ICD-10-CM

## 2018-01-04 DIAGNOSIS — R30.0 DYSURIA: ICD-10-CM

## 2018-01-04 LAB — MAGNESIUM SERPL-MCNC: 2.2 MG/DL (ref 1.6–2.3)

## 2018-01-04 PROCEDURE — 82306 VITAMIN D 25 HYDROXY: CPT | Performed by: FAMILY MEDICINE

## 2018-01-04 PROCEDURE — 83735 ASSAY OF MAGNESIUM: CPT | Performed by: FAMILY MEDICINE

## 2018-01-04 PROCEDURE — 99214 OFFICE O/P EST MOD 30 MIN: CPT | Performed by: FAMILY MEDICINE

## 2018-01-04 PROCEDURE — 36415 COLL VENOUS BLD VENIPUNCTURE: CPT | Performed by: FAMILY MEDICINE

## 2018-01-04 RX ORDER — NITROFURANTOIN 25; 75 MG/1; MG/1
CAPSULE ORAL
Qty: 14 CAPSULE | Refills: 11 | Status: SHIPPED | OUTPATIENT
Start: 2018-01-04 | End: 2019-07-31

## 2018-01-04 RX ORDER — ALPRAZOLAM 0.5 MG
0.5 TABLET ORAL 3 TIMES DAILY PRN
Qty: 30 TABLET | Refills: 5 | Status: SHIPPED | OUTPATIENT
Start: 2018-01-04 | End: 2019-07-31

## 2018-01-04 RX ORDER — RIZATRIPTAN BENZOATE 10 MG/1
10 TABLET ORAL
Qty: 30 TABLET | Refills: 0 | Status: SHIPPED | OUTPATIENT
Start: 2018-01-04 | End: 2021-09-23

## 2018-01-04 RX ORDER — PHENAZOPYRIDINE HYDROCHLORIDE 200 MG/1
200 TABLET, FILM COATED ORAL 3 TIMES DAILY PRN
Qty: 6 TABLET | Refills: 11 | Status: SHIPPED | OUTPATIENT
Start: 2018-01-04 | End: 2019-07-31

## 2018-01-04 RX ORDER — NITROFURANTOIN 25; 75 MG/1; MG/1
CAPSULE ORAL
Refills: 0 | COMMUNITY
Start: 2017-12-14 | End: 2018-01-04

## 2018-01-04 NOTE — MR AVS SNAPSHOT
"              After Visit Summary   2018    Henna Higginbotham    MRN: [id number]           Patient Information     Date Of Birth          1970        Visit Information        Provider Department      2018 10:45 AM Ra Mack MD [facility]        Today's Diagnoses     Encounter for vitamin deficiency screening    -  1    Dysuria        Other migraine without status migrainosus, intractable        Anxiety state           Follow-ups after your visit        Who to contact     If you have questions or need follow up information about today's clinic visit or your schedule please contact [facility] directly at [phone].  Normal or non-critical lab and imaging results will be communicated to you by [employer], letter or phone within 4 business days after the clinic has received the results. If you do not hear from us within 7 days, please contact the clinic through [employer] or phone. If you have a critical or abnormal lab result, we will notify you by phone as soon as possible.  Submit refill requests through [employer] or call your pharmacy and they will forward the refill request to us. Please allow 3 business days for your refill to be completed.          Additional Information About Your Visit        MyChart Information     [employer] lets you send messages to your doctor, view your test results, renew your prescriptions, schedule appointments and more. To sign up, go to [email or web address] . Click on \"Log in\" on the left side of the screen, which will take you to the Welcome page. Then click on \"Sign up Now\" on the right side of the page.     You will be asked to enter the access code listed below, as well as some personal information. Please follow the directions to create your username and password.     Your access code is: [email or web address]  Expires: 2018 11:34 AM     Your access code will  in 90 days. If you need help or a new code, please call your [place] " clinic or [phone].        Care EveryWhere ID     This is your Care EveryWhere ID. This could be used by other organizations to access your [place] medical records  [id number]        Your Vitals Were     Pulse Temperature Pulse Oximetry BMI (Body Mass Index)          73 98.4  F (36.9  C) (Oral) 99% 23.56 kg/m2         Blood Pressure from Last 3 Encounters:   01/04/18 119/73   04/06/17 129/81   01/31/17 120/70    Weight from Last 3 Encounters:   01/04/18 133 lb (60.3 kg)   04/06/17 130 lb (59 kg)   01/31/17 131 lb (59.4 kg)              We Performed the Following     Magnesium     Vitamin D Deficiency          Today's Medication Changes          These changes are accurate as of: 1/4/18 11:34 AM.  If you have any questions, ask your nurse or doctor.               These medicines have changed or have updated prescriptions.        Dose/Directions    nitroFURantoin (macrocrystal-monohydrate) 100 MG capsule   Commonly known as:  MACROBID   This may have changed:  See the new instructions.   Used for:  Dysuria   Changed by:  Ra Mack MD        TAKE 1 CAPSULE (100 MG) EVERY 12 (TWELVE) HOURS FOR 7 DAYS.   Quantity:  14 capsule   Refills:  11       sulfamethoxazole-trimethoprim 800-160 MG per tablet   Commonly known as:  BACTRIM DS   This may have changed:  Another medication with the same name was removed. Continue taking this medication, and follow the directions you see here.   Used for:  Dysuria   Changed by:  Ra Mack MD        Dose:  1 tablet   Take 1 tablet by mouth 2 times daily For bladder infections   Quantity:  14 tablet   Refills:  11            Where to get your medicines      These medications were sent to [facility]/pharmacy #[id number] - [facility] [address] [facility],  AT CORNER OF [address], , [facility] [address]     Phone:  [phone]     nitroFURantoin (macrocrystal-monohydrate) 100 MG capsule    phenazopyridine 200 MG tablet    rizatriptan  10 MG tablet         Some of these will need a paper prescription and others can be bought over the counter.  Ask your nurse if you have questions.     Bring a paper prescription for each of these medications     ALPRAZolam 0.5 MG tablet                Primary Care Provider Office Phone # Fax #    Ra Mack [phone] [address] [facility] [address]        Equal Access to Services     [facility] MARY : Hadii aad ku hadasho Soomaali, waaxda luqadaha, qaybta kaalmada adeegyada, waxay idiin hayaan adeeg kharash la'aan . So [facility] [phone].    ATENCIÓN: Si habla espgayla, tiene a rosa disposición servicios gratuitos de asistencia lingüística. Llame al [phone].    We comply with applicable federal civil rights laws and [place] laws. We do not discriminate on the basis of race, color, national origin, age, disability, sex, sexual orientation, or gender identity.            Thank you!     Thank you for choosing [facility] [place] [facility]  for your care. Our goal is always to provide you with excellent care. Hearing back from our patients is one way we can continue to improve our services. Please take a few minutes to complete the written survey that you may receive in the mail after your visit with us. Thank you!             Your Updated Medication List - Protect others around you: Learn how to safely use, store and throw away your medicines at www.disposemymeds.org.          This list is accurate as of: 1/4/18 11:34 AM.  Always use your most recent med list.                   Brand Name Dispense Instructions for use Diagnosis    ALPRAZolam 0.5 MG tablet    XANAX    30 tablet    Take 1 tablet (0.5 mg) by mouth 3 times daily as needed for anxiety    Dysuria       levonorgestrel 20 MCG/24HR IUD    MIRENA     1 each by Intrauterine route once        nitroFURantoin (macrocrystal-monohydrate) 100 MG capsule    MACROBID    14 capsule    TAKE 1 CAPSULE (100 MG) EVERY 12 (TWELVE) HOURS  FOR 7 DAYS.    Dysuria       phenazopyridine 200 MG tablet    PYRIDIUM    6 tablet    Take 1 tablet (200 mg) by mouth 3 times daily as needed    Dysuria       rizatriptan 10 MG tablet    MAXALT    30 tablet    Take 1 tablet (10 mg) by mouth at onset of headache for migraine MAY REPEAT IN 2 HOURS IF NEEDED    Other migraine without status migrainosus, intractable       sulfamethoxazole-trimethoprim 800-160 MG per tablet    BACTRIM DS    14 tablet    Take 1 tablet by mouth 2 times daily For bladder infections    Dysuria       trimethoprim-polymyxin b ophthalmic solution    POLYTRIM    1 Bottle    Place 1 drop into the right eye every 4 hours    Acute conjunctivitis of right eye

## 2018-01-04 NOTE — PROGRESS NOTES
SUBJECTIVE:  SUBJECTIVE:  47 year old.The patient has a history of  follow-up of depressive symptoms.    Since last visit the patient has doing well.  Notes from that visit reviewed. PHQ-9 has been reviewed.  Current symptoms include: Anxiety   Symptoms that have subjectively improved include: Anxiety  Previous and current treatment modalities employed include: Medications   xanax  Patient Active Problem List   Diagnosis     Anxiety state     CARDIOVASCULAR SCREENING; LDL GOAL LESS THAN 160     Migraines      Reviewed health maintenance  OBJECTIVE:  no apparent distress  /73  Pulse 73  Temp 98.4  F (36.9  C) (Oral)  Wt 133 lb (60.3 kg)  SpO2 99%  BMI 23.56 kg/m2       MENTAL STATUS EXAM:   1. Clinical observations:Patient was clean and was adequately groomed. Patient's emotional presentation was cooperative and tamra/open. Patient spoke clearly and articulately. Patient maintained adequate eye contact and was cooperative in answering questions.  2. Patient appeared to be well-oriented in all spheres with coherent, logical, goal directed and relevent thinking.  3. Thought content: Denies auditory and visual hallucinations or delusions.  4. Affect and mood: Affect is alert and her emotional attitude is described as normal.  5. Sensorium and cognition: Patient was in contact with reality and oriented to place, time, person and situation. patient demonstrated no impairment in immediate, recent, or remote memory. patient's insight was adequate without obvious deficits in intelligence.    ICD-10-CM    1. Dysuria R30.0 ALPRAZolam (XANAX) 0.5 MG tablet     phenazopyridine (PYRIDIUM) 200 MG tablet     nitroFURantoin, macrocrystal-monohydrate, (MACROBID) 100 MG capsule   2. Other migraine without status migrainosus, intractable G43.819 rizatriptan (MAXALT) 10 MG tablet   3. Anxiety state F41.1     PLAN:Follow up in 6 months       :  SUBJECTIVE:  47 year old.The patient has a history of headaches.  This started  years ago. Location frontal  quality no aura Associated symptoms are photophobia and nauseous.   .  Better with maxault.  Reviewed health maintenance  Patient Active Problem List   Diagnosis     Anxiety state     CARDIOVASCULAR SCREENING; LDL GOAL LESS THAN 160     Migraines     Past Medical History:   Diagnosis Date     Anxiety state, unspecified      Migraines        OBJECTIVE:  no apparent distress  /73  Pulse 73  Temp 98.4  F (36.9  C) (Oral)  Wt 133 lb (60.3 kg)  SpO2 99%  BMI 23.56 kg/m2    LUNGS:  CTA B/L, no wheezing or crackles.   Cardiovascular: negative, PMI normal. No lifts, heaves, or thrills. RRR. No murmurs, clicks gallops or rub   Gastrointestinal: Abdomen soft, non-tender. BS normal. No masses, organomegaly       ICD-10-CM    1. Dysuria R30.0 ALPRAZolam (XANAX) 0.5 MG tablet     phenazopyridine (PYRIDIUM) 200 MG tablet     nitroFURantoin, macrocrystal-monohydrate, (MACROBID) 100 MG capsule   2. Other migraine without status migrainosus, intractable G43.819 rizatriptan (MAXALT) 10 MG tablet   3. Anxiety state F41.1     PLAN: Follow up in 1 year

## 2018-01-04 NOTE — LETTER
[facility]    01/04/18    Patient: Henna Higginbotham  YOB: 1970  Medical Record Number: [id number]                                                                  Controlled Substance Agreement  I understand that my care provider has prescribed controlled substances (narcotics, tranquilizers, and/or stimulants) to help manage my condition(s).  I am taking this medicine to help me function or work.  I know that this is strong medicine.  It could have serious side effects and even cause a dependency on the drug.  If I stop these medicines suddenly, I could have severe withdrawal symptoms.    The risks, benefits, and side effects of these medication(s) were explained to me.  I agree that:  1. I will take part in other treatments as advised by my provider.  This may be psychiatry or counseling, physical therapy, behavioral therapy, group treatment, or a referral to a pain clinic.  I will reduce or stop my medicine when my provider tells me to do so.   2. I will take my medicines as prescribed.  I will not change the dose or schedule unless my provider tells me to.  There will be no refills if I  run out early.   I may be contacted at any time without warning and asked to complete a drug test or pill count.   3. I will keep all my appointments at the clinic.  If I miss appointments or fail to follow instructions, my provider may stop my medicine.  4. I will not ask other providers to prescribe controlled substances. And I will not accept controlled substances from other people. If I need another prescribed controlled substance for a new reason, I will notify my provider within one business day.  5. If I enroll in the [place] Medical Marijuana program, I will tell my provider.  I will also sign an agreement to share my medical records with my provider.  6. I will use one pharmacy to fill all of my controlled substance prescriptions.  If my prescription is mailed to my pharmacy, it may take 5  to 7 days for my medicine to be ready.  7. I understand that my provider, clinic care team, and pharmacy can track controlled substance prescriptions from other providers through a central database (prescription monitoring program).  8. I will bring in my list of medications (or my medicine bottles) each time I come to the clinic.  REV- 04/2016                                                                                                                                            Page 1 of 2      [facility]    01/04/18    Patient: Henna Higginbotham  YOB: 1970  Medical Record Number: [id number]    9. Refills of controlled substances will be made only during office hours.  It is up to me to make sure that I do not run out of my medicines on weekends or holidays.    10. I am responsible for my prescriptions.  If the medicine is lost or stolen, it will not be replaced.   I also agree not to share these medicines with anyone.  11. I agree to not use ANY illegal or recreational drugs.  This includes marijuana, cocaine, bath salts or other drugs.  I agree not to use alcohol unless my provider says I may.  I agree to give urine samples whenever asked.  If I fail to give a urine sample, the provider may stop my medicine.     12. I will tell my nurse or provider right away if I become pregnant or have a new medical problem treated outside of [facility].  13. I understand that this medicine can affect my thinking and judgment.  It may be unsafe for me to drive, use machinery and do dangerous tasks.  I will not do any of these things until I know how the medicine affects me.  If my dose changes, I will wait to see how it affects me.  I will contact my provider if I have concerns about medicine side effects.  I understand that if I do not follow any of the conditions above, my prescriptions or treatment may be stopped.    I agree that my provider, clinic care team, and pharmacy may work with any  city, state or federal law enforcement agency that investigates the misuse, sale, or other diversion of my controlled medicine. I will allow my provider to discuss my care with or share a copy of this agreement with any other treating provider, pharmacy or emergency room where I receive care.  I agree to give up (waive) any right of privacy or confidentiality with respect to these authorizations.   I have read this agreement and have asked questions about anything I did not understand.   ___________________________________    ___________________________  Patient Signature                                                           Date and Time  ___________________________________     ____________________________  Witness                                                                            Date and Time  ___________________________________  Ra Mack MD  REV-  04/2016                                                                                                                                                                 Page 2 of 2

## 2018-01-04 NOTE — NURSING NOTE
"Chief Complaint   Patient presents with     RECHECK     discuss bladder medication- recently switched to Macrobid after going to [facility], and that seemed to help better than Bactrim. Would like Iron and Vit D levels checked as well.        Initial /73  Pulse 73  Temp 98.4  F (36.9  C) (Oral)  Wt 133 lb (60.3 kg)  SpO2 99%  BMI 23.56 kg/m2 Estimated body mass index is 23.56 kg/(m^2) as calculated from the following:    Height as of 12/19/16: 5' 3\" (1.6 m).    Weight as of this encounter: 133 lb (60.3 kg).  Medication Reconciliation: complete    Chelsea Bowles MA    "

## 2018-01-05 LAB — DEPRECATED CALCIDIOL+CALCIFEROL SERPL-MC: 30 UG/L (ref 20–75)

## 2018-02-01 ENCOUNTER — TELEPHONE (OUTPATIENT)
Dept: FAMILY MEDICINE | Facility: CLINIC | Age: 48
End: 2018-02-01

## 2018-02-01 DIAGNOSIS — G43.819 OTHER MIGRAINE WITHOUT STATUS MIGRAINOSUS, INTRACTABLE: ICD-10-CM

## 2018-02-01 DIAGNOSIS — R30.0 DYSURIA: ICD-10-CM

## 2018-02-01 RX ORDER — NITROFURANTOIN 25; 75 MG/1; MG/1
CAPSULE ORAL
Qty: 14 CAPSULE | Refills: 11 | Status: CANCELLED | OUTPATIENT
Start: 2018-02-01

## 2018-02-01 RX ORDER — PHENAZOPYRIDINE HYDROCHLORIDE 200 MG/1
200 TABLET, FILM COATED ORAL 3 TIMES DAILY PRN
Qty: 6 TABLET | Refills: 11 | Status: CANCELLED | OUTPATIENT
Start: 2018-02-01

## 2018-02-01 RX ORDER — RIZATRIPTAN BENZOATE 10 MG/1
10 TABLET ORAL
Qty: 30 TABLET | Refills: 0 | Status: CANCELLED | OUTPATIENT
Start: 2018-02-01

## 2018-02-01 NOTE — TELEPHONE ENCOUNTER
Patient is calling stating she would like to request all her medications (patient stated multiple and did not want to disclose them all, states provider will know which ones) that are due for refill sent to [facility] for prior auth. Patient has changed employer and therefore will need to also change clinics to [facility]. Patient was told by [employer] to give her id and group ID: [id number] Grp: [id number]. Thank you.

## 2018-05-07 ENCOUNTER — TRANSFERRED RECORDS (OUTPATIENT)
Dept: MULTI SPECIALTY CLINIC | Facility: CLINIC | Age: 48
End: 2018-05-07

## 2018-07-02 ENCOUNTER — OFFICE VISIT (OUTPATIENT)
Dept: URGENT CARE | Facility: URGENT CARE | Age: 48
End: 2018-07-02
Payer: COMMERCIAL

## 2018-07-02 ENCOUNTER — RADIANT APPOINTMENT (OUTPATIENT)
Dept: GENERAL RADIOLOGY | Facility: CLINIC | Age: 48
End: 2018-07-02
Attending: PHYSICIAN ASSISTANT
Payer: COMMERCIAL

## 2018-07-02 VITALS
WEIGHT: 133 LBS | HEART RATE: 84 BPM | BODY MASS INDEX: 23.56 KG/M2 | DIASTOLIC BLOOD PRESSURE: 87 MMHG | OXYGEN SATURATION: 98 % | TEMPERATURE: 98.3 F | SYSTOLIC BLOOD PRESSURE: 132 MMHG

## 2018-07-02 DIAGNOSIS — R05.3 COUGH, PERSISTENT: ICD-10-CM

## 2018-07-02 DIAGNOSIS — R05.9 COUGH: ICD-10-CM

## 2018-07-02 DIAGNOSIS — R07.0 THROAT PAIN: Primary | ICD-10-CM

## 2018-07-02 LAB
DEPRECATED S PYO AG THROAT QL EIA: NORMAL
SPECIMEN SOURCE: NORMAL

## 2018-07-02 PROCEDURE — 87081 CULTURE SCREEN ONLY: CPT | Performed by: PHYSICIAN ASSISTANT

## 2018-07-02 PROCEDURE — 71046 X-RAY EXAM CHEST 2 VIEWS: CPT | Mod: FY

## 2018-07-02 PROCEDURE — 87880 STREP A ASSAY W/OPTIC: CPT | Performed by: PHYSICIAN ASSISTANT

## 2018-07-02 PROCEDURE — 87801 DETECT AGNT MULT DNA AMPLI: CPT | Performed by: PHYSICIAN ASSISTANT

## 2018-07-02 PROCEDURE — 99214 OFFICE O/P EST MOD 30 MIN: CPT | Performed by: PHYSICIAN ASSISTANT

## 2018-07-02 RX ORDER — AZITHROMYCIN 250 MG/1
TABLET, FILM COATED ORAL
Qty: 6 TABLET | Refills: 0 | Status: SHIPPED | OUTPATIENT
Start: 2018-07-02 | End: 2019-07-31

## 2018-07-02 ASSESSMENT — ENCOUNTER SYMPTOMS
ALLERGIC/IMMUNOLOGIC NEGATIVE: 1
DIZZINESS: 0
COUGH: 1
HEADACHES: 0
WOUND: 0
BRUISES/BLEEDS EASILY: 0
VOMITING: 0
RHINORRHEA: 0
WEAKNESS: 0
HEMATOLOGIC/LYMPHATIC NEGATIVE: 1
LIGHT-HEADEDNESS: 0
NAUSEA: 0
SHORTNESS OF BREATH: 0
FEVER: 0
NECK STIFFNESS: 0
ENDOCRINE NEGATIVE: 1
DIARRHEA: 0
CARDIOVASCULAR NEGATIVE: 1
CHEST TIGHTNESS: 0
EYES NEGATIVE: 1
NECK PAIN: 0
PALPITATIONS: 0
MUSCULOSKELETAL NEGATIVE: 1
BACK PAIN: 0
MYALGIAS: 0
CHILLS: 0
JOINT SWELLING: 0
ARTHRALGIAS: 0
SORE THROAT: 1

## 2018-07-02 NOTE — MR AVS SNAPSHOT
After Visit Summary   7/2/2018    Henna Higginbotham    MRN: [id number]           Patient Information     Date Of Birth          1970        Visit Information        Provider Department      7/2/2018 2:20 PM Chuy Guzman PA-C [facility]        Today's Diagnoses     Throat pain    -  1    Cough        Cough, persistent           Follow-ups after your visit        Who to contact     If you have questions or need follow up information about today's clinic visit or your schedule please contact [facility] directly at [phone].  Normal or non-critical lab and imaging results will be communicated to you by [employer], letter or phone within 4 business days after the clinic has received the results. If you do not hear from us within 7 days, please contact the clinic through [employer] or phone. If you have a critical or abnormal lab result, we will notify you by phone as soon as possible.  Submit refill requests through [employer] or call your pharmacy and they will forward the refill request to us. Please allow 3 business days for your refill to be completed.          Additional Information About Your Visit        MyChart Information     [employer] gives you secure access to your electronic health record. If you see a primary care provider, you can also send messages to your care team and make appointments. If you have questions, please call your primary care clinic.  If you do not have a primary care provider, please call [phone] and they will assist you.        Care EveryWhere ID     This is your Care EveryWhere ID. This could be used by other organizations to access your [place] medical records  [id number]        Your Vitals Were     Pulse Temperature Pulse Oximetry BMI (Body Mass Index)          84 98.3  F (36.8  C) (Oral) 98% 23.56 kg/m2         Blood Pressure from Last 3 Encounters:   07/02/18 132/87   01/04/18 119/73   04/06/17 129/81    Weight from  Last 3 Encounters:   07/02/18 133 lb (60.3 kg)   01/04/18 133 lb (60.3 kg)   04/06/17 130 lb (59 kg)              We Performed the Following     Beta strep group A culture     Bordetella pert parapert DNA pcr     Strep, Rapid Screen     XR Chest 2 Views          Today's Medication Changes          These changes are accurate as of 7/2/18  3:53 PM.  If you have any questions, ask your nurse or doctor.               Start taking these medicines.        Dose/Directions    azithromycin 250 MG tablet   Commonly known as:  ZITHROMAX   Used for:  Cough, persistent   Started by:  Chuy Guzman PA-C        Two tablets first day, then one tablet daily for four days.   Quantity:  6 tablet   Refills:  0            Where to get your medicines      These medications were sent to [facility] [place] - [address]  [address], [facility] [address]     Phone:  [phone]     azithromycin 250 MG tablet                Primary Care Provider Office Phone # Fax #    Ra Chuy Mack [phone] [address] [facility] [address]        Equal Access to Services     [facility]: Hadii aad ku hadasho Soomaali, waaxda luqadaha, qaybta kaalmada adeegyada, rubia guajardo . So [facility] [phone].    ATENCIÓN: Si habla español, tiene a rosa disposición servicios gratuitos de asistencia lingüística. Llame al [phone].    We comply with applicable federal civil rights laws and [place] laws. We do not discriminate on the basis of race, color, national origin, age, disability, sex, sexual orientation, or gender identity.            Thank you!     Thank you for choosing [facility]  for your care. Our goal is always to provide you with excellent care. Hearing back from our patients is one way we can continue to improve our services. Please take a few minutes to complete the written survey that you may receive in the  mail after your visit with us. Thank you!             Your Updated Medication List - Protect others around you: Learn how to safely use, store and throw away your medicines at www.disposemymeds.org.          This list is accurate as of 7/2/18  3:53 PM.  Always use your most recent med list.                   Brand Name Dispense Instructions for use Diagnosis    ALPRAZolam 0.5 MG tablet    XANAX    30 tablet    Take 1 tablet (0.5 mg) by mouth 3 times daily as needed for anxiety    Dysuria       azithromycin 250 MG tablet    ZITHROMAX    6 tablet    Two tablets first day, then one tablet daily for four days.    Cough, persistent       levonorgestrel 20 MCG/24HR IUD    MIRENA     1 each by Intrauterine route once        nitroFURantoin (macrocrystal-monohydrate) 100 MG capsule    MACROBID    14 capsule    TAKE 1 CAPSULE (100 MG) EVERY 12 (TWELVE) HOURS FOR 7 DAYS.    Dysuria       phenazopyridine 200 MG tablet    PYRIDIUM    6 tablet    Take 1 tablet (200 mg) by mouth 3 times daily as needed    Dysuria       rizatriptan 10 MG tablet    MAXALT    30 tablet    Take 1 tablet (10 mg) by mouth at onset of headache for migraine MAY REPEAT IN 2 HOURS IF NEEDED    Other migraine without status migrainosus, intractable

## 2018-07-02 NOTE — PROGRESS NOTES
Chief Complaint:     Chief Complaint   Patient presents with     Cough     Patient complains of cough and sore throat x 1 month        HPI: Henna Higginbotham is an 48 year old female who presents with a cough.   It began  1 month(s) ago and has unchanged.  Cough is dry, persistent, spasmodic There is no shortness of breath, wheezing and chest pain.  She has had coughing fits to the point of emesis on several occasions.    Associated symptoms: sore throat and congestion.    Recent travel?  no.      ROS:     Review of Systems   Constitutional: Negative for chills and fever.   HENT: Positive for sore throat. Negative for congestion, ear pain and rhinorrhea.    Eyes: Negative.    Respiratory: Positive for cough. Negative for chest tightness and shortness of breath.    Cardiovascular: Negative.  Negative for chest pain and palpitations.   Gastrointestinal: Negative for diarrhea, nausea and vomiting.   Endocrine: Negative.    Genitourinary: Negative.    Musculoskeletal: Negative.  Negative for arthralgias, back pain, joint swelling, myalgias, neck pain and neck stiffness.   Skin: Negative.  Negative for rash and wound.   Allergic/Immunologic: Negative.  Negative for immunocompromised state.   Neurological: Negative for dizziness, weakness, light-headedness and headaches.   Hematological: Negative.  Does not bruise/bleed easily.        Respiratory History  occasional episodes of bronchitis    No pertinent family Hx at this time.  Patient has never smoked.     Family History   Family History   Problem Relation Age of Onset     Breast Cancer Mother      Cancer Mother      skin     Cancer Father      kidney     Hypertension Father      Lipids Father      Cancer Maternal Grandmother      ovarian     Diabetes Maternal Grandfather      Cancer Maternal Grandfather      skin     Arthritis Brother      rheumatoid        Problem history  Patient Active Problem List   Diagnosis     Anxiety state     CARDIOVASCULAR SCREENING; LDL GOAL  LESS THAN 160     Migraines        Allergies  No Known Allergies     Social History  Social History     Social History     Marital status:      Spouse name: N/A     Number of children: N/A     Years of education: N/A     Occupational History     Not on file.     Social History Main Topics     Smoking status: Never Smoker     Smokeless tobacco: Never Used     Alcohol use Yes      Comment: occ.     Drug use: No     Sexual activity: Yes     Partners: Male     Birth control/ protection: Pill     Other Topics Concern     Not on file     Social History Narrative        Current Meds    Current Outpatient Prescriptions:      ALPRAZolam (XANAX) 0.5 MG tablet, Take 1 tablet (0.5 mg) by mouth 3 times daily as needed for anxiety, Disp: 30 tablet, Rfl: 5     azithromycin (ZITHROMAX) 250 MG tablet, Two tablets first day, then one tablet daily for four days., Disp: 6 tablet, Rfl: 0     levonorgestrel (MIRENA) 20 MCG/24HR IUD, 1 each by Intrauterine route once, Disp: , Rfl:      nitroFURantoin, macrocrystal-monohydrate, (MACROBID) 100 MG capsule, TAKE 1 CAPSULE (100 MG) EVERY 12 (TWELVE) HOURS FOR 7 DAYS., Disp: 14 capsule, Rfl: 11     phenazopyridine (PYRIDIUM) 200 MG tablet, Take 1 tablet (200 mg) by mouth 3 times daily as needed, Disp: 6 tablet, Rfl: 11     rizatriptan (MAXALT) 10 MG tablet, Take 1 tablet (10 mg) by mouth at onset of headache for migraine MAY REPEAT IN 2 HOURS IF NEEDED, Disp: 30 tablet, Rfl: 0  No current facility-administered medications for this visit.     Facility-Administered Medications Ordered in Other Visits:      perflutren diluted in saline (DEFINITY) injection 4.5 mL, 4.5 mL, Intravenous, Once, Kehr, Kristen M, PA-C        OBJECTIVE     Vital signs reviewed by Chuy Guzman  /87 (BP Location: Left arm, Patient Position: Chair, Cuff Size: Adult Regular)  Pulse 84  Temp 98.3  F (36.8  C) (Oral)  Wt 133 lb (60.3 kg)  SpO2 98%  BMI 23.56 kg/m2     PEFR:  General appearance: healthy,  alert and no distress  Ears: R TM - normal: no effusions, no erythema, and normal landmarks, L TM - normal: no effusions, no erythema, and normal landmarks  Eyes: R normal, EOM's intact and PERRLA, L normal, EOM's intact and PERRLA  Nose: normal  Oropharynx: marked erythema  Neck: supple and no adenopathy  Lungs: normal and clear to auscultation  Heart: S1, S2 normal, no murmur, click, rub or gallop, regular rate and rhythm, chest is clear without rales or wheezing, no pedal edema, no JVD          Labs:     Results for orders placed or performed in visit on 07/02/18   XR Chest 2 Views    Narrative    XR CHEST 2 VW 7/2/2018 3:20 PM    COMPARISON: 2/1/2014    HISTORY: Cough.      Impression    IMPRESSION: Cardiac silhouette and pulmonary vasculature are within  normal limits. No focal airspace disease, pleural effusion or  pneumothorax.    ROMANA ALEX MD   Strep, Rapid Screen   Result Value Ref Range    Specimen Description Throat     Rapid Strep A Screen       NEGATIVE: No Group A streptococcal antigen detected by immunoassay, await culture report.       Medical Decision Making:    Differential Diagnosis:  URI Adult/Peds:  Bronchitis-viral, Bronchospasm, Pneumonia, Viral upper respiratory illness and pertussis        ASSESSMENT    1. Throat pain    2. Cough    3. Cough, persistent        PLAN  Discussed imaging with patient.  CXR was unremarkable.  With worsening cough till emesis, pertussis swab sent in.  Will start her on Zithromax tonight and call when results are available. She was instructed to limit exposure to others.   Rest, Push fluids, vaporizer, elevation of head of bed.  Ibuprofen and or Tylenol for any fever or body aches.  Over the counter cough suppressant- PRN- as discussed.   If symptoms worsen, recheck immediately otherwise follow up with your PCP PRN.  Worrisome symptoms discussed with instructions to go to the ED.  Patient verbalized understanding and agreed with this plan.       Chuy Guzman   7/2/2018, 2:56 PM

## 2018-07-03 LAB
B PERT+PARAPERT DNA PNL SPEC NAA+PROBE: NEGATIVE
BACTERIA SPEC CULT: NORMAL
SPECIMEN SOURCE: NORMAL
SPECIMEN SOURCE: NORMAL

## 2019-07-31 ENCOUNTER — OFFICE VISIT (OUTPATIENT)
Dept: FAMILY MEDICINE | Facility: CLINIC | Age: 49
End: 2019-07-31
Payer: COMMERCIAL

## 2019-07-31 VITALS
HEIGHT: 64 IN | HEART RATE: 73 BPM | BODY MASS INDEX: 22.53 KG/M2 | DIASTOLIC BLOOD PRESSURE: 60 MMHG | WEIGHT: 132 LBS | RESPIRATION RATE: 18 BRPM | SYSTOLIC BLOOD PRESSURE: 96 MMHG | TEMPERATURE: 98.4 F | OXYGEN SATURATION: 97 %

## 2019-07-31 DIAGNOSIS — Z00.00 ROUTINE GENERAL MEDICAL EXAMINATION AT A HEALTH CARE FACILITY: ICD-10-CM

## 2019-07-31 DIAGNOSIS — R53.82 CHRONIC FATIGUE: ICD-10-CM

## 2019-07-31 DIAGNOSIS — Z13.220 LIPID SCREENING: ICD-10-CM

## 2019-07-31 DIAGNOSIS — Z13.1 SCREENING FOR DIABETES MELLITUS: ICD-10-CM

## 2019-07-31 DIAGNOSIS — Z12.11 SPECIAL SCREENING FOR MALIGNANT NEOPLASMS, COLON: Primary | ICD-10-CM

## 2019-07-31 DIAGNOSIS — R05.3 COUGH, PERSISTENT: ICD-10-CM

## 2019-07-31 DIAGNOSIS — R30.0 DYSURIA: ICD-10-CM

## 2019-07-31 PROCEDURE — 99396 PREV VISIT EST AGE 40-64: CPT | Performed by: FAMILY MEDICINE

## 2019-07-31 RX ORDER — PHENAZOPYRIDINE HYDROCHLORIDE 200 MG/1
200 TABLET, FILM COATED ORAL 3 TIMES DAILY PRN
Qty: 6 TABLET | Refills: 11 | Status: SHIPPED | OUTPATIENT
Start: 2019-07-31 | End: 2021-11-08

## 2019-07-31 RX ORDER — AZITHROMYCIN 250 MG/1
TABLET, FILM COATED ORAL
Qty: 6 TABLET | Refills: 0 | Status: CANCELLED | OUTPATIENT
Start: 2019-07-31

## 2019-07-31 RX ORDER — NITROFURANTOIN 25; 75 MG/1; MG/1
CAPSULE ORAL
Qty: 14 CAPSULE | Refills: 11 | Status: SHIPPED | OUTPATIENT
Start: 2019-07-31 | End: 2021-11-08

## 2019-07-31 RX ORDER — ALPRAZOLAM 0.5 MG
0.5 TABLET ORAL 3 TIMES DAILY PRN
Qty: 30 TABLET | Refills: 5 | Status: SHIPPED | OUTPATIENT
Start: 2019-07-31 | End: 2024-07-29

## 2019-07-31 ASSESSMENT — ENCOUNTER SYMPTOMS
DYSURIA: 0
ABDOMINAL PAIN: 0
CHILLS: 0
PARESTHESIAS: 0
DIZZINESS: 0
CONSTIPATION: 0
JOINT SWELLING: 0
HEARTBURN: 0
MYALGIAS: 0
DIARRHEA: 0
HEADACHES: 0
NAUSEA: 0
ARTHRALGIAS: 0
HEMATOCHEZIA: 0
BREAST MASS: 0
SORE THROAT: 0
COUGH: 0
HEMATURIA: 0
SHORTNESS OF BREATH: 0
FEVER: 0
EYE PAIN: 0
WEAKNESS: 0
FREQUENCY: 0
PALPITATIONS: 0
NERVOUS/ANXIOUS: 0

## 2019-07-31 ASSESSMENT — ANXIETY QUESTIONNAIRES
2. NOT BEING ABLE TO STOP OR CONTROL WORRYING: NOT AT ALL
1. FEELING NERVOUS, ANXIOUS, OR ON EDGE: NOT AT ALL
7. FEELING AFRAID AS IF SOMETHING AWFUL MIGHT HAPPEN: NOT AT ALL
IF YOU CHECKED OFF ANY PROBLEMS ON THIS QUESTIONNAIRE, HOW DIFFICULT HAVE THESE PROBLEMS MADE IT FOR YOU TO DO YOUR WORK, TAKE CARE OF THINGS AT HOME, OR GET ALONG WITH OTHER PEOPLE: NOT DIFFICULT AT ALL
3. WORRYING TOO MUCH ABOUT DIFFERENT THINGS: NOT AT ALL
5. BEING SO RESTLESS THAT IT IS HARD TO SIT STILL: NOT AT ALL

## 2019-07-31 ASSESSMENT — PAIN SCALES - GENERAL: PAINLEVEL: NO PAIN (0)

## 2019-07-31 ASSESSMENT — PATIENT HEALTH QUESTIONNAIRE - PHQ9
SUM OF ALL RESPONSES TO PHQ QUESTIONS 1-9: 0
5. POOR APPETITE OR OVEREATING: NOT AT ALL

## 2019-07-31 ASSESSMENT — MIFFLIN-ST. JEOR: SCORE: 1203.75

## 2019-07-31 NOTE — PROGRESS NOTES
SUBJECTIVE:   CC: Henna Higginbotham is an 49 year old woman who presents for preventive health visit.     Healthy Habits:     Getting at least 3 servings of Calcium per day:  NO    Bi-annual eye exam:  Yes    Dental care twice a year:  Yes    Sleep apnea or symptoms of sleep apnea:  None    Diet:  Regular (no restrictions)    Frequency of exercise:  2-3 days/week    Duration of exercise:  45-60 minutes    Taking medications regularly:  Yes    Medication side effects:  None    PHQ-2 Total Score: 0    Additional concerns today:  No              Today's PHQ-2 Score:   PHQ-2 ( 1999 Pfizer) 7/31/2019   Q1: Little interest or pleasure in doing things 0   Q2: Feeling down, depressed or hopeless 0   PHQ-2 Score 0   Q1: Little interest or pleasure in doing things Not at all   Q2: Feeling down, depressed or hopeless Not at all   PHQ-2 Score 0       Abuse: Current or Past(Physical, Sexual or Emotional)- No  Do you feel safe in your environment? Yes    Social History     Tobacco Use     Smoking status: Never Smoker     Smokeless tobacco: Never Used   Substance Use Topics     Alcohol use: Yes     Comment: occ.         Alcohol Use 7/31/2019   Prescreen: >3 drinks/day or >7 drinks/week? No   Prescreen: >3 drinks/day or >7 drinks/week? -       Reviewed orders with patient.  Reviewed health maintenance and updated orders accordingly - Yes      Mammogram Screening: Patient over age 50, mutual decision to screen reflected in health maintenance.    Pertinent mammograms are reviewed under the imaging tab.  History of abnormal Pap smear: NO - age 30-65 PAP every 5 years with negative HPV co-testing recommended     Reviewed and updated as needed this visit by clinical staff  Tobacco  Allergies  Meds  Med Hx  Surg Hx  Fam Hx  Soc Hx        Reviewed and updated as needed this visit by Provider            Review of Systems   Constitutional: Negative for chills and fever.   HENT: Negative for congestion, ear pain, hearing loss and  "sore throat.    Eyes: Negative for pain and visual disturbance.   Respiratory: Negative for cough and shortness of breath.    Cardiovascular: Negative for chest pain, palpitations and peripheral edema.   Gastrointestinal: Negative for abdominal pain, constipation, diarrhea, heartburn, hematochezia and nausea.   Breasts:  Negative for tenderness, breast mass and discharge.   Genitourinary: Negative for dysuria, frequency, genital sores, hematuria, pelvic pain, urgency, vaginal bleeding and vaginal discharge.   Musculoskeletal: Negative for arthralgias, joint swelling and myalgias.   Skin: Negative for rash.   Neurological: Negative for dizziness, weakness, headaches and paresthesias.   Psychiatric/Behavioral: Negative for mood changes. The patient is not nervous/anxious.           OBJECTIVE:   BP 96/60   Pulse 73   Temp 98.4  F (36.9  C) (Oral)   Resp 18   Ht 1.626 m (5' 4\")   Wt 59.9 kg (132 lb)   SpO2 97%   BMI 22.66 kg/m    Physical Exam  GENERAL: healthy, alert and no distress  EYES: Eyes grossly normal to inspection, PERRL and conjunctivae and sclerae normal  HENT: ear canals and TM's normal, nose and mouth without ulcers or lesions  NECK: no adenopathy, no asymmetry, masses, or scars and thyroid normal to palpation  RESP: lungs clear to auscultation - no rales, rhonchi or wheezes  CV: regular rate and rhythm, normal S1 S2, no S3 or S4, no murmur, click or rub, no peripheral edema and peripheral pulses strong  ABDOMEN: soft, nontender, no hepatosplenomegaly, no masses and bowel sounds normal  MS: no gross musculoskeletal defects noted, no edema  SKIN: no suspicious lesions or rashes  NEURO: Normal strength and tone, mentation intact and speech normal  PSYCH: mentation appears normal, affect normal/bright    Diagnostic Test Results:  Labs reviewed in Epic  pending    ASSESSMENT/PLAN:       ICD-10-CM    1. Special screening for malignant neoplasms, colon Z12.11 GASTROENTEROLOGY ADULT REF PROCEDURE ONLY   2. " "Dysuria R30.0 nitroFURantoin macrocrystal-monohydrate (MACROBID) 100 MG capsule     phenazopyridine (PYRIDIUM) 200 MG tablet     ALPRAZolam (XANAX) 0.5 MG tablet   3. Cough, persistent R05    4. Screening for diabetes mellitus Z13.1 Glucose, whole blood   5. Lipid screening Z13.220 Lipid Profile (Chol, Trig, HDL, LDL calc)   6. Chronic fatigue R53.82 Vitamin D Deficiency     TSH with free T4 reflex       COUNSELING:  Reviewed preventive health counseling, as reflected in patient instructions       Regular exercise       Healthy diet/nutrition    Estimated body mass index is 22.66 kg/m  as calculated from the following:    Height as of this encounter: 1.626 m (5' 4\").    Weight as of this encounter: 59.9 kg (132 lb).         reports that she has never smoked. She has never used smokeless tobacco.      Counseling Resources:  ATP IV Guidelines  Pooled Cohorts Equation Calculator  Breast Cancer Risk Calculator  FRAX Risk Assessment  ICSI Preventive Guidelines  Dietary Guidelines for Americans, 2010  USDA's MyPlate  ASA Prophylaxis  Lung CA Screening    Ra Mack MD  [facility]  "

## 2019-07-31 NOTE — NURSING NOTE
"Chief Complaint   Patient presents with     Physical     vit d level - thyroid - t4 t3        Initial BP 96/60   Pulse 73   Temp 98.4  F (36.9  C) (Oral)   Resp 18   Ht 1.626 m (5' 4\")   Wt 59.9 kg (132 lb)   SpO2 97%   BMI 22.66 kg/m   Estimated body mass index is 22.66 kg/m  as calculated from the following:    Height as of this encounter: 1.626 m (5' 4\").    Weight as of this encounter: 59.9 kg (132 lb).  Medication Reconciliation: complete  Rosita Perez M.A.    "

## 2019-08-07 ENCOUNTER — TELEPHONE (OUTPATIENT)
Dept: FAMILY MEDICINE | Facility: CLINIC | Age: 49
End: 2019-08-07

## 2019-10-25 ENCOUNTER — TRANSFERRED RECORDS (OUTPATIENT)
Dept: HEALTH INFORMATION MANAGEMENT | Facility: CLINIC | Age: 49
End: 2019-10-25

## 2019-10-25 LAB — PAP-ABSTRACT: NORMAL

## 2020-02-24 ENCOUNTER — HEALTH MAINTENANCE LETTER (OUTPATIENT)
Age: 50
End: 2020-02-24

## 2020-04-24 ENCOUNTER — TELEPHONE (OUTPATIENT)
Dept: FAMILY MEDICINE | Facility: CLINIC | Age: 50
End: 2020-04-24

## 2020-12-13 ENCOUNTER — HEALTH MAINTENANCE LETTER (OUTPATIENT)
Age: 50
End: 2020-12-13

## 2021-02-21 ENCOUNTER — HEALTH MAINTENANCE LETTER (OUTPATIENT)
Age: 51
End: 2021-02-21

## 2021-09-22 ASSESSMENT — ENCOUNTER SYMPTOMS
PARESTHESIAS: 0
PALPITATIONS: 0
CONSTIPATION: 0
DIARRHEA: 0
BREAST MASS: 0
MYALGIAS: 0
NAUSEA: 0
HEADACHES: 0
HEARTBURN: 0
CHILLS: 0
DYSURIA: 0
HEMATOCHEZIA: 0
DIZZINESS: 0
ARTHRALGIAS: 0
HEMATURIA: 0
FREQUENCY: 0
SHORTNESS OF BREATH: 0
EYE PAIN: 0
NERVOUS/ANXIOUS: 0
COUGH: 0
FEVER: 0
WEAKNESS: 0
ABDOMINAL PAIN: 0
JOINT SWELLING: 0
SORE THROAT: 0

## 2021-09-23 ENCOUNTER — OFFICE VISIT (OUTPATIENT)
Dept: FAMILY MEDICINE | Facility: CLINIC | Age: 51
End: 2021-09-23
Payer: COMMERCIAL

## 2021-09-23 VITALS
HEART RATE: 64 BPM | SYSTOLIC BLOOD PRESSURE: 114 MMHG | RESPIRATION RATE: 16 BRPM | WEIGHT: 133 LBS | BODY MASS INDEX: 22.71 KG/M2 | TEMPERATURE: 97.2 F | DIASTOLIC BLOOD PRESSURE: 83 MMHG | OXYGEN SATURATION: 99 % | HEIGHT: 64 IN

## 2021-09-23 DIAGNOSIS — Z00.00 ROUTINE HISTORY AND PHYSICAL EXAMINATION OF ADULT: Primary | ICD-10-CM

## 2021-09-23 LAB
BASOPHILS # BLD AUTO: 0 10E3/UL (ref 0–0.2)
BASOPHILS NFR BLD AUTO: 1 %
DEPRECATED CALCIDIOL+CALCIFEROL SERPL-MC: 82 UG/L (ref 20–75)
EOSINOPHIL # BLD AUTO: 0.1 10E3/UL (ref 0–0.7)
EOSINOPHIL NFR BLD AUTO: 2 %
ERYTHROCYTE [DISTWIDTH] IN BLOOD BY AUTOMATED COUNT: 12 % (ref 10–15)
HBA1C MFR BLD: 5.4 % (ref 0–5.6)
HCT VFR BLD AUTO: 42.7 % (ref 35–47)
HGB BLD-MCNC: 14.6 G/DL (ref 11.7–15.7)
LYMPHOCYTES # BLD AUTO: 2.6 10E3/UL (ref 0.8–5.3)
LYMPHOCYTES NFR BLD AUTO: 40 %
MCH RBC QN AUTO: 32.8 PG (ref 26.5–33)
MCHC RBC AUTO-ENTMCNC: 34.2 G/DL (ref 31.5–36.5)
MCV RBC AUTO: 96 FL (ref 78–100)
MONOCYTES # BLD AUTO: 0.5 10E3/UL (ref 0–1.3)
MONOCYTES NFR BLD AUTO: 8 %
NEUTROPHILS # BLD AUTO: 3.2 10E3/UL (ref 1.6–8.3)
NEUTROPHILS NFR BLD AUTO: 50 %
PLATELET # BLD AUTO: 260 10E3/UL (ref 150–450)
RBC # BLD AUTO: 4.45 10E6/UL (ref 3.8–5.2)
VIT B12 SERPL-MCNC: 567 PG/ML (ref 193–986)
WBC # BLD AUTO: 6.4 10E3/UL (ref 4–11)

## 2021-09-23 PROCEDURE — 99386 PREV VISIT NEW AGE 40-64: CPT | Performed by: INTERNAL MEDICINE

## 2021-09-23 PROCEDURE — 85025 COMPLETE CBC W/AUTO DIFF WBC: CPT | Performed by: INTERNAL MEDICINE

## 2021-09-23 PROCEDURE — 80048 BASIC METABOLIC PNL TOTAL CA: CPT | Performed by: INTERNAL MEDICINE

## 2021-09-23 PROCEDURE — 36415 COLL VENOUS BLD VENIPUNCTURE: CPT | Performed by: INTERNAL MEDICINE

## 2021-09-23 PROCEDURE — 84443 ASSAY THYROID STIM HORMONE: CPT | Performed by: INTERNAL MEDICINE

## 2021-09-23 PROCEDURE — 83036 HEMOGLOBIN GLYCOSYLATED A1C: CPT | Performed by: INTERNAL MEDICINE

## 2021-09-23 PROCEDURE — 80061 LIPID PANEL: CPT | Performed by: INTERNAL MEDICINE

## 2021-09-23 PROCEDURE — 82306 VITAMIN D 25 HYDROXY: CPT | Performed by: INTERNAL MEDICINE

## 2021-09-23 PROCEDURE — 82607 VITAMIN B-12: CPT | Performed by: INTERNAL MEDICINE

## 2021-09-23 ASSESSMENT — MIFFLIN-ST. JEOR: SCORE: 1198.28

## 2021-09-23 NOTE — PROGRESS NOTES
SUBJECTIVE:   CC: Henna Higginbotham is an 51 year old woman who presents for preventive health visit.       Patient has been advised of split billing requirements and indicates understanding: Yes  Healthy Habits:     Getting at least 3 servings of Calcium per day:  Yes    Bi-annual eye exam:  Yes    Dental care twice a year:  Yes    Sleep apnea or symptoms of sleep apnea:  None    Diet:  Regular (no restrictions), Carbohydrate counting and Breakfast skipped    Frequency of exercise:  4-5 days/week    Duration of exercise:  30-45 minutes    Taking medications regularly:  No    Medication side effects:  Not applicable    PHQ-2 Total Score: 0    Additional concerns today:  No      Today's PHQ-2 Score:   PHQ-2 ( 1999 Pfizer) 9/22/2021   Q1: Little interest or pleasure in doing things 0   Q2: Feeling down, depressed or hopeless 0   PHQ-2 Score 0   Q1: Little interest or pleasure in doing things Not at all   Q2: Feeling down, depressed or hopeless Not at all   PHQ-2 Score 0       Abuse: Current or Past (Physical, Sexual or Emotional) - No  Do you feel safe in your environment? Yes    Have you ever done Advance Care Planning? (For example, a Health Directive, POLST, or a discussion with a medical provider or your loved ones about your wishes): No, advance care planning information given to patient to review.  Patient declined advance care planning discussion at this time.    Social History     Tobacco Use     Smoking status: Never Smoker     Smokeless tobacco: Never Used   Substance Use Topics     Alcohol use: Yes     Comment: occ.     If you drink alcohol do you typically have >3 drinks per day or >7 drinks per week? No    Alcohol Use 9/22/2021   Prescreen: >3 drinks/day or >7 drinks/week? No   Prescreen: >3 drinks/day or >7 drinks/week? -       Reviewed orders with patient.  Reviewed health maintenance and updated orders accordingly - Yes  Labs reviewed in EPIC    Breast Cancer Screening:    FHS-7:   Breast CA Risk  "Assessment (FHS-7) 9/22/2021   Did any of your first-degree relatives have breast or ovarian cancer? Yes   Did any of your relatives have bilateral breast cancer? No   Did any man in your family have breast cancer? No   Did any woman in your family have breast and ovarian cancer? No   Did any woman in your family have breast cancer before age 50 y? No   Do you have 2 or more relatives with breast and/or ovarian cancer? Yes   Do you have 2 or more relatives with breast and/or bowel cancer? No       Pertinent mammograms are reviewed under the imaging tab.       Reviewed and updated as needed this visit by clinical staff                 Reviewed and updated as needed this visit by Provider                Past Medical History:   Diagnosis Date     Anxiety state, unspecified      Migraines         Review of Systems  CONSTITUTIONAL: NEGATIVE for fever, chills, change in weight  INTEGUMENTARY/SKIN: NEGATIVE for worrisome rashes, moles or lesions  EYES: NEGATIVE for vision changes or irritation  ENT: NEGATIVE for ear, mouth and throat problems  RESP: NEGATIVE for significant cough or SOB  BREAST: NEGATIVE for masses, tenderness or discharge  CV: NEGATIVE for chest pain, palpitations or peripheral edema  GI: NEGATIVE for nausea, abdominal pain, heartburn, or change in bowel habits  : NEGATIVE for unusual urinary or vaginal symptoms. No vaginal bleeding.  MUSCULOSKELETAL: NEGATIVE for significant arthralgias or myalgia  NEURO: NEGATIVE for weakness, dizziness or paresthesias  PSYCHIATRIC: NEGATIVE for changes in mood or affect      OBJECTIVE:   /83 (BP Location: Right arm, Patient Position: Sitting, Cuff Size: Adult Regular)   Pulse 64   Temp 97.2  F (36.2  C) (Temporal)   Resp 16   Ht 1.626 m (5' 4\")   Wt 60.3 kg (133 lb)   SpO2 99%   BMI 22.83 kg/m    Physical Exam  GENERAL: alert and no distress  EYES: Eyes grossly normal to inspection, PERRL and conjunctivae and sclerae normal  HENT: ear canals and TM's " "normal, nose and mouth without ulcers or lesions  NECK: no adenopathy, no asymmetry, masses, or scars and thyroid normal to palpation  RESP: lungs clear to auscultation - no rales, rhonchi or wheezes  CV: regular rate and rhythm, normal S1 S2, no S3 or S4, no murmur, click or rub, no peripheral edema and peripheral pulses strong  ABDOMEN: soft, nontender, no hepatosplenomegaly, no masses and bowel sounds normal  MS: no gross musculoskeletal defects noted, no edema  SKIN: no suspicious lesions or rashes  NEURO: Normal strength and tone, mentation intact and speech normal  PSYCH: mentation appears normal, affect normal/bright    Diagnostic Test Results:  Labs reviewed in Epic    ASSESSMENT/PLAN:   (Z00.00) Routine history and physical examination of adult  (primary encounter diagnosis)  Comment: yearly physical exam today  Plan: Lipid panel reflex to direct LDL Fasting, CBC         with platelets and differential, Basic         metabolic panel  (Ca, Cl, CO2, Creat, Gluc, K,         Na, BUN), TSH with free T4 reflex, Vitamin B12,        Vitamin D Deficiency      Patient has been advised of split billing requirements and indicates understanding: Yes  COUNSELING:  Reviewed preventive health counseling, as reflected in patient instructions  Special attention given to:        Regular exercise       Healthy diet/nutrition    Estimated body mass index is 22.66 kg/m  as calculated from the following:    Height as of 7/31/19: 1.626 m (5' 4\").    Weight as of 7/31/19: 59.9 kg (132 lb).        She reports that she has never smoked. She has never used smokeless tobacco.      Counseling Resources:  ATP IV Guidelines  Pooled Cohorts Equation Calculator  Breast Cancer Risk Calculator  BRCA-Related Cancer Risk Assessment: FHS-7 Tool  FRAX Risk Assessment  ICSI Preventive Guidelines  Dietary Guidelines for Americans, 2010  USDA's MyPlate  ASA Prophylaxis  Lung CA Screening    Dary Singh MD  [facility]  "

## 2021-09-24 LAB
ANION GAP SERPL CALCULATED.3IONS-SCNC: 2 MMOL/L (ref 3–14)
BUN SERPL-MCNC: 10 MG/DL (ref 7–30)
CALCIUM SERPL-MCNC: 9.1 MG/DL (ref 8.5–10.1)
CHLORIDE BLD-SCNC: 108 MMOL/L (ref 94–109)
CHOLEST SERPL-MCNC: 270 MG/DL
CO2 SERPL-SCNC: 30 MMOL/L (ref 20–32)
CREAT SERPL-MCNC: 0.89 MG/DL (ref 0.52–1.04)
FASTING STATUS PATIENT QL REPORTED: YES
GFR SERPL CREATININE-BSD FRML MDRD: 75 ML/MIN/1.73M2
GLUCOSE BLD-MCNC: 98 MG/DL (ref 70–99)
HDLC SERPL-MCNC: 110 MG/DL
LDLC SERPL CALC-MCNC: 152 MG/DL
NONHDLC SERPL-MCNC: 160 MG/DL
POTASSIUM BLD-SCNC: 4.4 MMOL/L (ref 3.4–5.3)
SODIUM SERPL-SCNC: 140 MMOL/L (ref 133–144)
TRIGL SERPL-MCNC: 41 MG/DL
TSH SERPL DL<=0.005 MIU/L-ACNC: 1.77 MU/L (ref 0.4–4)

## 2021-09-26 ENCOUNTER — HEALTH MAINTENANCE LETTER (OUTPATIENT)
Age: 51
End: 2021-09-26

## 2021-09-30 ENCOUNTER — VIRTUAL VISIT (OUTPATIENT)
Dept: FAMILY MEDICINE | Facility: CLINIC | Age: 51
End: 2021-09-30
Payer: COMMERCIAL

## 2021-09-30 DIAGNOSIS — E67.3 HIGH VITAMIN D LEVEL: ICD-10-CM

## 2021-09-30 DIAGNOSIS — F41.1 ANXIETY STATE: ICD-10-CM

## 2021-09-30 DIAGNOSIS — G43.819 OTHER MIGRAINE WITHOUT STATUS MIGRAINOSUS, INTRACTABLE: ICD-10-CM

## 2021-09-30 DIAGNOSIS — E78.5 HYPERLIPIDEMIA LDL GOAL <100: Primary | ICD-10-CM

## 2021-09-30 PROCEDURE — 99214 OFFICE O/P EST MOD 30 MIN: CPT | Mod: 95 | Performed by: INTERNAL MEDICINE

## 2021-09-30 NOTE — PROGRESS NOTES
Marisol is a 51 year old who is being evaluated via a billable telephone visit.      How would you like to obtain your AVS? MyChart  Will anyone else be joining your telephone visit? No      Chief Complaint:     Follow up on multiple concerns including lab results    HPI:   Patient Henna Higginbotham is a very pleasant 51 year old female with history of anxiety, hyperlipidemia with high HDL today for telephone visit for follow up of recent lab results. Regarding the patient's anxiety, the patient's symptoms are currently well controlled on low dose Xanax medication. Regarding the patient's hyperlipidemia with high HDL, the patient is on diet, exercise treatment at this time.       Current Medications:     Current Outpatient Medications   Medication Sig Dispense Refill     ALPRAZolam (XANAX) 0.5 MG tablet Take 1 tablet (0.5 mg) by mouth 3 times daily as needed for anxiety 30 tablet 5     levonorgestrel (MIRENA) 20 MCG/24HR IUD 1 each by Intrauterine route once       nitroFURantoin macrocrystal-monohydrate (MACROBID) 100 MG capsule TAKE 1 CAPSULE (100 MG) EVERY 12 (TWELVE) HOURS FOR 7 DAYS. 14 capsule 11     phenazopyridine (PYRIDIUM) 200 MG tablet Take 1 tablet (200 mg) by mouth 3 times daily as needed 6 tablet 11         Allergies:    No Known Allergies         Past Medical History:     Past Medical History:   Diagnosis Date     Anxiety state, unspecified      Migraines          Past Surgical History:     Past Surgical History:   Procedure Laterality Date     COLONOSCOPY  January 2021     HC ENLARGE BREAST WITH IMPLANT  2004         Family Medical History:     Family History   Problem Relation Age of Onset     Breast Cancer Mother      Cancer Mother         skin     Other Cancer Mother         skin     Cancer Father         kidney     Hypertension Father      Lipids Father      Prostate Cancer Father      Other Cancer Father         kidney     Cancer Maternal Grandmother         ovarian     Other Cancer Maternal  Grandmother         ovarian     Diabetes Maternal Grandfather      Cancer Maternal Grandfather         skin     Arthritis Brother         rheumatoid         Social History:     Social History     Socioeconomic History     Marital status:      Spouse name: Not on file     Number of children: Not on file     Years of education: Not on file     Highest education level: Not on file   Occupational History     Not on file   Tobacco Use     Smoking status: Never Smoker     Smokeless tobacco: Never Used   Substance and Sexual Activity     Alcohol use: Yes     Comment: occ.     Drug use: No     Sexual activity: Yes     Partners: Male     Birth control/protection: I.U.D.   Other Topics Concern     Parent/sibling w/ CABG, MI or angioplasty before 65F 55M? No   Social History Narrative     Not on file     Social Determinants of Health     Financial Resource Strain:      Difficulty of Paying Living Expenses:    Food Insecurity:      Worried About Running Out of Food in the Last Year:      Ran Out of Food in the Last Year:    Transportation Needs:      Lack of Transportation (Medical):      Lack of Transportation (Non-Medical):    Physical Activity:      Days of Exercise per Week:      Minutes of Exercise per Session:    Stress:      Feeling of Stress :    Social Connections:      Frequency of Communication with Friends and Family:      Frequency of Social Gatherings with Friends and Family:      Attends Buddhist Services:      Active Member of Clubs or Organizations:      Attends Club or Organization Meetings:      Marital Status:    Intimate Partner Violence:      Fear of Current or Ex-Partner:      Emotionally Abused:      Physically Abused:      Sexually Abused:            Review of System:     Constitutional: Negative for fever or chills  Skin: Negative for rashes  Ears/Nose/Throat: Negative for nasal congestion, sore throat  Respiratory: No shortness of breath, dyspnea on exertion, cough, or  hemoptysis  Cardiovascular: Negative for chest pain  Gastrointestinal: Negative for nausea, vomiting  Genitourinary: Negative for dysuria, hematuria  Musculoskeletal: Negative for myalgias  Neurologic: Negative for headaches  Psychiatric: Negative for depression, anxiety  Hematologic/Lymphatic/Immunologic: Negative  Endocrine: Negative  Behavioral: Negative for tobacco use       Physical Exam:   There were no vitals taken for this visit.    RESP: no cough over the phone  NEURO: Alert & Oriented x 3.   PSYCH: mentation appears normal, affect normal        Diagnostic Test Results:     Diagnostic Test Results:  Labs reviewed in [facility]    Recent Labs   Lab Test 09/23/21  1450 12/19/16  1117   CHOL 270* 216*    84   * 116*   TRIG 41 78     Last Comprehensive Metabolic Panel:  Sodium   Date Value Ref Range Status   09/23/2021 140 133 - 144 mmol/L Final     Potassium   Date Value Ref Range Status   09/23/2021 4.4 3.4 - 5.3 mmol/L Final     Chloride   Date Value Ref Range Status   09/23/2021 108 94 - 109 mmol/L Final     Carbon Dioxide (CO2)   Date Value Ref Range Status   09/23/2021 30 20 - 32 mmol/L Final     Anion Gap   Date Value Ref Range Status   09/23/2021 2 (L) 3 - 14 mmol/L Final     Glucose   Date Value Ref Range Status   09/23/2021 98 70 - 99 mg/dL Final   12/19/2016 98 70 - 99 mg/dL Final     Comment:     Fasting specimen     Urea Nitrogen   Date Value Ref Range Status   09/23/2021 10 7 - 30 mg/dL Final     Creatinine   Date Value Ref Range Status   09/23/2021 0.89 0.52 - 1.04 mg/dL Final     GFR Estimate   Date Value Ref Range Status   09/23/2021 75 >60 mL/min/1.73m2 Final     Comment:     As of July 11, 2021, eGFR is calculated by the CKD-EPI creatinine equation, without race adjustment. eGFR can be influenced by muscle mass, exercise, and diet. The reported eGFR is an estimation only and is only applicable if the renal function is stable.     Calcium   Date Value Ref Range Status   09/23/2021 9.1  8.5 - 10.1 mg/dL Final     Lab Results   Component Value Date    WBC 6.4 09/23/2021    WBC 7.0 05/06/2013     Lab Results   Component Value Date    RBC 4.45 09/23/2021    RBC 4.40 05/06/2013     Lab Results   Component Value Date    HGB 14.6 09/23/2021    HGB 14.0 05/06/2013     Lab Results   Component Value Date    HCT 42.7 09/23/2021    HCT 41.2 05/06/2013     Lab Results   Component Value Date    MCV 96 09/23/2021    MCV 94 05/06/2013     Lab Results   Component Value Date    MCH 32.8 09/23/2021    MCH 31.7 05/06/2013     Lab Results   Component Value Date    MCHC 34.2 09/23/2021    MCHC 33.9 05/06/2013     Lab Results   Component Value Date    RDW 12.0 09/23/2021    RDW 11.7 05/06/2013     Lab Results   Component Value Date     09/23/2021     05/06/2013         ASSESSMENT/PLAN:       Henna was seen today for recheck.    Diagnoses and all orders for this visit:    Hyperlipidemia LDL goal <100  - high HDL  - continue diet, exercise      Anxiety state  - stable, continue low dose Xanax      Other migraine without status migrainosus, intractable  - stable, continue current therapy      High vitamin D level  - mildly elevated Vitamin D, stop daily Vitamin D supplementation medication for 3 to 6 months          Follow Up Plan:     Patient is instructed to return to Internal Medicine clinic for follow-up visit in 3 to 6 months.    Phone call duration: 30 minutes    Dary Singh MD  Internal Medicine  [facility]

## 2021-11-05 NOTE — PROGRESS NOTES
SUBJECTIVE:                                                   Henna Higginbotham is a 51 year old female who presents to clinic today for the following health issue(s):  Patient presents with:  IUD: patient has questions of when to remove IUD, what to expect if in menopause     HPI:  Establishing care here.    Few years ago, had hot flashes and night sweats a few years ago lasting about 6 months and then hot flashes resolved. No hot flashes since. Has had IUD for past ~10ish and hasn't bled, so she isn't sure if she has gone through menopause yet. No vaginal bleeding or spotting. No vaginal discharge. She is wondering when she should removed it. Partner has vasectomy.     Has history of recurring bladder infections post-coital. Does ppx urinate after sex to try to help.  Has had 15 year prescription of Macrobid and pyridium that she would like refilled.  Has been evaluated in the past by urology.    [facility] secondary to family history of breast cancer (mother).     No LMP recorded. (Menstrual status: IUD)..   Patient is sexually active,   Using IUD for contraception.    reports that she has never smoked. She has never used smokeless tobacco.  STD testing offered?  Declined  Health maintenance updated: reviewed    Today's PHQ-2 Score:   PHQ-2 (  [employer]) 2021   Q1: Little interest or pleasure in doing things 0   Q2: Feeling down, depressed or hopeless 0   PHQ-2 Score 0   Q1: Little interest or pleasure in doing things -   Q2: Feeling down, depressed or hopeless -   PHQ-2 Score -     Today's PHQ-9 Score:   PHQ-9 SCORE 2021   PHQ-9 Total Score 0     Today's SEB-7 Score:   SEB-7 SCORE 2021   Total Score 0       Problem list and histories reviewed & adjusted, as indicated.  Additional history: as documented.    Patient Active Problem List   Diagnosis     Anxiety state     CARDIOVASCULAR SCREENING; LDL GOAL LESS THAN 160     Migraines     Past Surgical History:  "  Procedure Laterality Date     COLONOSCOPY  01/2021     CRYOCAUTERY OF CERVIX  2005     HC ENLARGE BREAST WITH IMPLANT  2004      Social History     Tobacco Use     Smoking status: Never Smoker     Smokeless tobacco: Never Used   Substance Use Topics     Alcohol use: Yes     Comment: occ.      Problem (# of Occurrences) Relation (Name,Age of Onset)    Breast Cancer (1) Mother (Mom, 50)    Diabetes (1) Maternal Grandfather (Grandpa Perez)    Hypertension (1) Father (Dad)    Kidney Cancer (1) Father (Dad)    Lipids (1) Father (Dad)    Ovarian Cancer (1) Maternal Grandmother (Grandcarolann Todd)    Prostate Cancer (1) Father (Dad)    Rheumatoid Arthritis (1) Brother    Skin Cancer (2) Mother (Mom), Maternal Grandfather (Grandrupert Todd)            Current Outpatient Medications   Medication Sig     levonorgestrel (MIRENA) 20 MCG/24HR IUD 1 each by Intrauterine route once     nitroFURantoin macrocrystal-monohydrate (MACROBID) 100 MG capsule TAKE 1 CAPSULE (100 MG) EVERY 12 (TWELVE) HOURS FOR 7 DAYS.     phenazopyridine (PYRIDIUM) 200 MG tablet Take 1 tablet (200 mg) by mouth 3 times daily as needed     ALPRAZolam (XANAX) 0.5 MG tablet Take 1 tablet (0.5 mg) by mouth 3 times daily as needed for anxiety (Patient not taking: Reported on 11/8/2021)     latanoprost (XALATAN) 0.005 % ophthalmic solution INSTILL 1 DROP AT NIGHT INTO BOTH EYES     No current facility-administered medications for this visit.     Facility-Administered Medications Ordered in Other Visits   Medication     perflutren diluted in saline (DEFINITY) injection 4.5 mL     No Known Allergies    ROS:  12 point review of systems negative other than symptoms noted below or in the HPI.  No urinary frequency or dysuria, bladder or kidney problems, Normal menstrual cycles      OBJECTIVE:     /62   Ht 1.626 m (5' 4\")   Wt 62.1 kg (137 lb)   BMI 23.52 kg/m    Body mass index is 23.52 kg/m .    Exam:  Constitutional:  Appearance: Well nourished, well " developed alert, in no acute distress  Chest:  Respiratory Effort:  Breathing unlabored. Clear to auscultation bilaterally.   Cardiovascular: Heart: Auscultation:  Regular rate, normal rhythm, no murmurs present  Neurologic:  Mental Status:  Oriented X3.    Psychiatric:  Mentation appears normal and affect normal/bright.     In-Clinic Test Results:  No results found for this or any previous visit (from the past 24 hour(s)).    ASSESSMENT/PLAN:                                                        ICD-10-CM    1. General counseling and advice on contraceptive management  Z30.09    2. Recurrent UTI  N39.0    3. Dysuria  R30.0 nitroFURantoin macrocrystal-monohydrate (MACROBID) 100 MG capsule     phenazopyridine (PYRIDIUM) 200 MG tablet     -Discussed Mirena IUD now approved for 7 years.  If not having any concerns, recommend leaving until due for removal.  Discussed benefits of IUD  -Pap due next year  -Refill prn prescription for recurrent UTIs    Follow-up in 1 year or sooner as needed    Michelle Snider MD  [facility] [place]

## 2021-11-08 ENCOUNTER — OFFICE VISIT (OUTPATIENT)
Dept: OBGYN | Facility: CLINIC | Age: 51
End: 2021-11-08
Payer: COMMERCIAL

## 2021-11-08 VITALS
SYSTOLIC BLOOD PRESSURE: 110 MMHG | HEIGHT: 64 IN | DIASTOLIC BLOOD PRESSURE: 62 MMHG | WEIGHT: 137 LBS | BODY MASS INDEX: 23.39 KG/M2

## 2021-11-08 DIAGNOSIS — N39.0 RECURRENT UTI: ICD-10-CM

## 2021-11-08 DIAGNOSIS — R30.0 DYSURIA: ICD-10-CM

## 2021-11-08 DIAGNOSIS — Z30.09 GENERAL COUNSELING AND ADVICE ON CONTRACEPTIVE MANAGEMENT: Primary | ICD-10-CM

## 2021-11-08 PROCEDURE — 99203 OFFICE O/P NEW LOW 30 MIN: CPT | Performed by: OBSTETRICS & GYNECOLOGY

## 2021-11-08 RX ORDER — NITROFURANTOIN 25; 75 MG/1; MG/1
CAPSULE ORAL
Qty: 14 CAPSULE | Refills: 11 | Status: SHIPPED | OUTPATIENT
Start: 2021-11-08 | End: 2022-11-25

## 2021-11-08 RX ORDER — LATANOPROST 50 UG/ML
SOLUTION/ DROPS OPHTHALMIC
COMMUNITY
Start: 2021-10-21 | End: 2024-07-29

## 2021-11-08 RX ORDER — PHENAZOPYRIDINE HYDROCHLORIDE 200 MG/1
200 TABLET, FILM COATED ORAL 3 TIMES DAILY PRN
Qty: 6 TABLET | Refills: 11 | Status: SHIPPED | OUTPATIENT
Start: 2021-11-08 | End: 2022-11-25

## 2021-11-08 ASSESSMENT — MIFFLIN-ST. JEOR: SCORE: 1216.43

## 2021-11-08 ASSESSMENT — ANXIETY QUESTIONNAIRES
IF YOU CHECKED OFF ANY PROBLEMS ON THIS QUESTIONNAIRE, HOW DIFFICULT HAVE THESE PROBLEMS MADE IT FOR YOU TO DO YOUR WORK, TAKE CARE OF THINGS AT HOME, OR GET ALONG WITH OTHER PEOPLE: NOT DIFFICULT AT ALL
5. BEING SO RESTLESS THAT IT IS HARD TO SIT STILL: NOT AT ALL
1. FEELING NERVOUS, ANXIOUS, OR ON EDGE: NOT AT ALL
6. BECOMING EASILY ANNOYED OR IRRITABLE: NOT AT ALL
2. NOT BEING ABLE TO STOP OR CONTROL WORRYING: NOT AT ALL
3. WORRYING TOO MUCH ABOUT DIFFERENT THINGS: NOT AT ALL
GAD7 TOTAL SCORE: 0
7. FEELING AFRAID AS IF SOMETHING AWFUL MIGHT HAPPEN: NOT AT ALL

## 2021-11-08 ASSESSMENT — PATIENT HEALTH QUESTIONNAIRE - PHQ9: 5. POOR APPETITE OR OVEREATING: NOT AT ALL

## 2021-11-09 ASSESSMENT — PATIENT HEALTH QUESTIONNAIRE - PHQ9: SUM OF ALL RESPONSES TO PHQ QUESTIONS 1-9: 0

## 2021-11-09 ASSESSMENT — ANXIETY QUESTIONNAIRES: GAD7 TOTAL SCORE: 0

## 2021-11-23 ENCOUNTER — TRANSFERRED RECORDS (OUTPATIENT)
Dept: HEALTH INFORMATION MANAGEMENT | Facility: CLINIC | Age: 51
End: 2021-11-23
Payer: COMMERCIAL

## 2021-11-29 ENCOUNTER — TELEPHONE (OUTPATIENT)
Dept: FAMILY MEDICINE | Facility: CLINIC | Age: 51
End: 2021-11-29
Payer: COMMERCIAL

## 2021-11-29 NOTE — TELEPHONE ENCOUNTER
Patient Quality Outreach    Patient is due for the following:   Breast Cancer Screening - Mammogram    NEXT STEPS:   Chart routed to abstraction.      Type of outreach:    none -- received Negative 11- Mammo report from [facility]       Questions for provider review:    None     Lisbeth Hooper, [facility]

## 2021-12-05 ENCOUNTER — E-VISIT (OUTPATIENT)
Dept: FAMILY MEDICINE | Facility: CLINIC | Age: 51
End: 2021-12-05
Payer: COMMERCIAL

## 2021-12-05 DIAGNOSIS — Z20.822 SUSPECTED COVID-19 VIRUS INFECTION: Primary | ICD-10-CM

## 2021-12-05 PROCEDURE — 99421 OL DIG E/M SVC 5-10 MIN: CPT | Performed by: INTERNAL MEDICINE

## 2021-12-06 RX ORDER — PREDNISONE 20 MG/1
20 TABLET ORAL 2 TIMES DAILY
Qty: 20 TABLET | Refills: 0 | Status: SHIPPED | OUTPATIENT
Start: 2021-12-06 | End: 2021-12-16

## 2021-12-06 NOTE — TELEPHONE ENCOUNTER
Provider E-Visit time total (minutes): 10 minutes    Chief Complaint:     E-visit for suspected COVID infection    HPI:   Patient Henna Higginbotham is a very pleasant 51 year old female today for E-visit for suspected COVID infection.      Current Medications:     Current Outpatient Medications   Medication Sig Dispense Refill     predniSONE (DELTASONE) 20 MG tablet Take 1 tablet (20 mg) by mouth 2 times daily for 10 days 20 tablet 0     ALPRAZolam (XANAX) 0.5 MG tablet Take 1 tablet (0.5 mg) by mouth 3 times daily as needed for anxiety (Patient not taking: Reported on 11/8/2021) 30 tablet 5     latanoprost (XALATAN) 0.005 % ophthalmic solution INSTILL 1 DROP AT NIGHT INTO BOTH EYES       levonorgestrel (MIRENA) 20 MCG/24HR IUD 1 each by Intrauterine route once       nitroFURantoin macrocrystal-monohydrate (MACROBID) 100 MG capsule TAKE 1 CAPSULE (100 MG) EVERY 12 (TWELVE) HOURS FOR 7 DAYS. 14 capsule 11     phenazopyridine (PYRIDIUM) 200 MG tablet Take 1 tablet (200 mg) by mouth 3 times daily as needed 6 tablet 11         Allergies:    No Known Allergies         Past Medical History:     Past Medical History:   Diagnosis Date     Anxiety state, unspecified      Migraines          Past Surgical History:     Past Surgical History:   Procedure Laterality Date     COLONOSCOPY  01/2021     CRYOCAUTERY OF CERVIX  2005     HC ENLARGE BREAST WITH IMPLANT  2004         Family Medical History:     Family History   Problem Relation Age of Onset     Breast Cancer Mother 50     Skin Cancer Mother      Kidney Cancer Father      Hypertension Father      Lipids Father      Prostate Cancer Father      Rheumatoid Arthritis Brother      Ovarian Cancer Maternal Grandmother      Diabetes Maternal Grandfather      Skin Cancer Maternal Grandfather          Social History:     Social History     Socioeconomic History     Marital status:      Spouse name: Not on file     Number of children: Not on file     Years of education: Not  on file     Highest education level: Not on file   Occupational History     Not on file   Tobacco Use     Smoking status: Never Smoker     Smokeless tobacco: Never Used   Substance and Sexual Activity     Alcohol use: Yes     Comment: occ.     Drug use: No     Sexual activity: Yes     Partners: Male     Birth control/protection: I.U.D.   Other Topics Concern     Parent/sibling w/ CABG, MI or angioplasty before 65F 55M? No   Social History Narrative     Not on file     Social Determinants of Health     Financial Resource Strain: Not on file   Food Insecurity: Not on file   Transportation Needs: Not on file   Physical Activity: Not on file   Stress: Not on file   Social Connections: Not on file   Intimate Partner Violence: Not on file   Housing Stability: Not on file           Review of System:     Constitutional: Negative for fever or chills  Skin: Negative for rashes  Ears/Nose/Throat: positive for nasal congestion, sore throat  Respiratory: positive for cough  Cardiovascular: Negative for chest pain  Gastrointestinal: Negative for nausea, vomiting  Genitourinary: Negative for dysuria, hematuria  Musculoskeletal: Negative for myalgias  Neurologic: Negative for headaches  Psychiatric: Negative for depression, anxiety  Hematologic/Lymphatic/Immunologic: Negative  Endocrine: Negative  Behavioral: Negative for tobacco use       Physical Exam:   There were no vitals taken for this visit.        Diagnostic Test Results:     Diagnostic Test Results:  Labs reviewed in Epic    ASSESSMENT/PLAN:       Henna was seen today for covid concern and covid concern.    Diagnoses and all orders for this visit:    Suspected COVID-19 virus infection  -     Symptomatic COVID-19 Virus (Coronavirus) by PCR; Future  -     predniSONE (DELTASONE) 20 MG tablet; Take 1 tablet (20 mg) by mouth 2 times daily for 10 days            Follow Up Plan:     Patient is instructed to return to Internal Medicine clinic for follow-up visit in 1  week.        Dary Singh MD  Internal Medicine  [facility]

## 2021-12-06 NOTE — PATIENT INSTRUCTIONS
Dear Henna Higginbotham,    Your symptoms show that you may have coronavirus (COVID-19). This illness can cause fever, cough and trouble breathing. Many people get a mild case and get better on their own. Some people can get very sick.    Will I be tested for COVID-19?  We would like to test you for Covid-19 virus. I have placed orders for this test.     To schedule: go to your In[employer] home page and scroll down to the section that says  You have an appointment that needs to be scheduled  and click the large green button that says  Schedule Now  and follow the steps to find the next available openings.    If you are unable to complete these In[employer] scheduling steps, please call [phone] to schedule your testing.     Return to work/school/ guidance:  Please let your workplace manager and staffing office know when your quarantine ends     We can t give you an exact date as it depends on the above. You can calculate this on your own or work with your manager/staffing office to calculate this. (For example if you were exposed on 10/4, you would have to quarantine for 14 full days. That would be through 10/18. You could return on 10/19.)      If you receive a positive COVID-19 test result, follow the guidance of the those who are giving you the results. Usually the return to work is 10 (or in some cases 20 days from symptom onset.) If you work at [facility], you must also be cleared by Employee Occupational Health and Safety to return to work.        If you receive a negative COVID-19 test result and did not have a high risk exposure to someone with a known positive COVID-19 test, you can return to work once you're free of fever for 24 hours without fever-reducing medication and your symptoms are improving or resolved.      If you receive a negative COVID-19 test and If you had a high risk exposure to someone who has tested positive for COVID-19 then you can return to work 14 days after your last contact  with the positive individual    Note: If you have ongoing exposure to the covid positive person, this quarantine period may be more than 14 days. (For example, if you are continued to be exposed to your child who tested positive and cannot isolate from them, then the quarantine of 7-14 days can't start until your child is no longer contagious. This is typically 10 days from onset of the child's symptoms. So the total duration may be 17-24 days in this case.)    Sign up for [employer].   We know it's scary to hear that you might have COVID-19. We want to track your symptoms to make sure you're okay over the next 2 weeks. Please look for an email from [employer]--this is a free, online program that we'll use to keep in touch. To sign up, follow the link in the email you will receive. Learn more at [email or web address]    How can I take care of myself?    Get lots of rest. Drink extra fluids (unless a doctor has told you not to)    Take Tylenol (acetaminophen) or ibuprofen for fever or pain. If you have liver or kidney problems, ask your family doctor if it's okay to take Tylenol o ibuprofen    If you have other health problems (like cancer, heart failure, an organ transplant or severe kidney disease): Call your specialty clinic if you don't feel better in the next 2 days.    Know when to call 911. Emergency warning signs include:  o Trouble breathing or shortness of breath  o Pain or pressure in the chest that doesn't go away  o Feeling confused like you haven't felt before, or not being able to wake up  o Bluish-colored lips or face    Where can I get more information?  [email or web address] [facility] - About COVID-19:   [email or web address]    CDC - What to Do If You're Sick:   www.cdc.gov/coronavirus/2019-ncov/about/steps-when-sick.html

## 2022-10-20 ENCOUNTER — TELEPHONE (OUTPATIENT)
Dept: FAMILY MEDICINE | Facility: CLINIC | Age: 52
End: 2022-10-20

## 2022-10-20 NOTE — TELEPHONE ENCOUNTER
General Call    Contacts       Type Contact Phone/Fax    10/20/2022 12:45 PM CDT Phone (Incoming) Marisol Higginbotham (Self) [phone] (H)        Reason for Call:  Requesting sooner appointment for annual physical     What are your questions or concerns:  Patient was calling to request sooner fit in physical appointment, patient is currently scheduled 01/06/23.  Patient states her  so provider and during that visit they discuss her getting a sooner appointment.  Patient states  told her provider said to contact office and they could fit her in.      Date of last appointment with provider:     Could we send this information to you in [email or web address] or would you prefer to receive a phone call?:   Patient would prefer a phone call   Okay to leave a detailed message?: Yes at Cell number on file:    Telephone Information:   Mobile [phone]

## 2022-11-18 ASSESSMENT — ENCOUNTER SYMPTOMS
PALPITATIONS: 0
CHILLS: 0
HEARTBURN: 0
JOINT SWELLING: 0
PARESTHESIAS: 0
DIARRHEA: 0
CONSTIPATION: 0
HEADACHES: 0
MYALGIAS: 0
DIZZINESS: 0
SORE THROAT: 0
HEMATOCHEZIA: 0
FREQUENCY: 0
NERVOUS/ANXIOUS: 0
WEAKNESS: 0
COUGH: 0
HEMATURIA: 0
ABDOMINAL PAIN: 0
ARTHRALGIAS: 0
DYSURIA: 0
FEVER: 0
SHORTNESS OF BREATH: 0
NAUSEA: 0
EYE PAIN: 0
BREAST MASS: 0

## 2022-11-25 ENCOUNTER — OFFICE VISIT (OUTPATIENT)
Dept: FAMILY MEDICINE | Facility: CLINIC | Age: 52
End: 2022-11-25
Payer: COMMERCIAL

## 2022-11-25 VITALS
RESPIRATION RATE: 16 BRPM | TEMPERATURE: 97.7 F | HEIGHT: 64 IN | HEART RATE: 63 BPM | DIASTOLIC BLOOD PRESSURE: 70 MMHG | BODY MASS INDEX: 23.05 KG/M2 | SYSTOLIC BLOOD PRESSURE: 105 MMHG | WEIGHT: 135 LBS | OXYGEN SATURATION: 100 %

## 2022-11-25 DIAGNOSIS — R30.0 DYSURIA: ICD-10-CM

## 2022-11-25 DIAGNOSIS — Z23 NEED FOR PROPHYLACTIC VACCINATION AND INOCULATION AGAINST INFLUENZA: ICD-10-CM

## 2022-11-25 DIAGNOSIS — Z00.00 ROUTINE HISTORY AND PHYSICAL EXAMINATION OF ADULT: Primary | ICD-10-CM

## 2022-11-25 PROCEDURE — 99213 OFFICE O/P EST LOW 20 MIN: CPT | Mod: 25 | Performed by: INTERNAL MEDICINE

## 2022-11-25 PROCEDURE — 90682 RIV4 VACC RECOMBINANT DNA IM: CPT | Performed by: INTERNAL MEDICINE

## 2022-11-25 PROCEDURE — 99396 PREV VISIT EST AGE 40-64: CPT | Mod: 25 | Performed by: INTERNAL MEDICINE

## 2022-11-25 PROCEDURE — 90471 IMMUNIZATION ADMIN: CPT | Performed by: INTERNAL MEDICINE

## 2022-11-25 RX ORDER — NITROFURANTOIN 25; 75 MG/1; MG/1
CAPSULE ORAL
Qty: 14 CAPSULE | Refills: 6 | Status: SHIPPED | OUTPATIENT
Start: 2022-11-25 | End: 2024-07-29

## 2022-11-25 RX ORDER — PHENAZOPYRIDINE HYDROCHLORIDE 200 MG/1
200 TABLET, FILM COATED ORAL 3 TIMES DAILY PRN
Qty: 30 TABLET | Refills: 5 | Status: SHIPPED | OUTPATIENT
Start: 2022-11-25 | End: 2024-07-29

## 2022-11-25 ASSESSMENT — PAIN SCALES - GENERAL: PAINLEVEL: NO PAIN (0)

## 2022-11-25 NOTE — PROGRESS NOTES
SUBJECTIVE:   CC: Marisol is an 52 year old who presents for preventive health visit.   Patient has been advised of split billing requirements and indicates understanding: Yes  Healthy Habits:     Getting at least 3 servings of Calcium per day:  Yes    Bi-annual eye exam:  Yes    Dental care twice a year:  Yes    Sleep apnea or symptoms of sleep apnea:  None    Diet:  Regular (no restrictions)    Frequency of exercise:  4-5 days/week    Duration of exercise:  30-45 minutes    Taking medications regularly:  Yes    Medication side effects:  Not applicable    PHQ-2 Total Score: 0    Additional concerns today:  No    Ability to successfully perform activities of daily living: Yes, no assistance needed  Home safety:  none identified   Hearing impairment: none      Today's PHQ-2 Score:   PHQ-2 ( 1999 Pfizer) 11/18/2022   Q1: Little interest or pleasure in doing things 0   Q2: Feeling down, depressed or hopeless 0   PHQ-2 Score 0   PHQ-2 Total Score (12-17 Years)- Positive if 3 or more points; Administer PHQ-A if positive -   Q1: Little interest or pleasure in doing things Not at all   Q2: Feeling down, depressed or hopeless Not at all   PHQ-2 Score 0           Social History     Tobacco Use     Smoking status: Never     Smokeless tobacco: Never   Substance Use Topics     Alcohol use: Yes     Comment: occ.     If you drink alcohol do you typically have >3 drinks per day or >7 drinks per week? No    Alcohol Use 11/18/2022   Prescreen: >3 drinks/day or >7 drinks/week? No   Prescreen: >3 drinks/day or >7 drinks/week? -   No flowsheet data found.    Reviewed orders with patient.  Reviewed health maintenance and updated orders accordingly - Yes  Labs reviewed in EPIC    Breast Cancer Screening:    FHS-7:   Breast CA Risk Assessment (FHS-7) 9/22/2021 11/18/2022   Did any of your first-degree relatives have breast or ovarian cancer? Yes Yes   Did any of your relatives have bilateral breast cancer? No No   Did any man in your  "family have breast cancer? No No   Did any woman in your family have breast and ovarian cancer? No Yes   Did any woman in your family have breast cancer before age 50 y? No No   Do you have 2 or more relatives with breast and/or ovarian cancer? Yes No   Do you have 2 or more relatives with breast and/or bowel cancer? No No       Pertinent mammograms are reviewed under the imaging tab.         Reviewed and updated as needed this visit by clinical staff                  Reviewed and updated as needed this visit by Provider                 Past Medical History:   Diagnosis Date     Anxiety state, unspecified      Migraines         Review of Systems  CONSTITUTIONAL: NEGATIVE for fever, chills, change in weight  INTEGUMENTARY/SKIN: NEGATIVE for worrisome rashes, moles or lesions  EYES: NEGATIVE for vision changes or irritation  ENT: NEGATIVE for ear, mouth and throat problems  RESP: NEGATIVE for significant cough or SOB  BREAST: NEGATIVE for masses, tenderness or discharge  CV: NEGATIVE for chest pain, palpitations or peripheral edema  GI: NEGATIVE for nausea, abdominal pain, heartburn, or change in bowel habits  : POSITIVE for frequent UTI's.  MUSCULOSKELETAL: NEGATIVE for significant arthralgias or myalgia  NEURO: NEGATIVE for weakness, dizziness or paresthesias  PSYCHIATRIC: NEGATIVE for changes in mood or affect      OBJECTIVE:   /70 (BP Location: Left arm, Patient Position: Sitting, Cuff Size: Adult Regular)   Pulse 63   Temp 97.7  F (36.5  C) (Temporal)   Resp 16   Ht 1.626 m (5' 4\")   Wt 61.2 kg (135 lb)   SpO2 100%   BMI 23.17 kg/m    Physical Exam  GENERAL: alert and no distress  EYES: Eyes grossly normal to inspection, PERRL and conjunctivae and sclerae normal  HENT: ear canals and TM's normal, nose and mouth without ulcers or lesions  NECK: no adenopathy, no asymmetry, masses, or scars and thyroid normal to palpation  RESP: lungs clear to auscultation - no rales, rhonchi or wheezes  CV: regular " rate and rhythm, normal S1 S2, no S3 or S4, no murmur, click or rub, no peripheral edema and peripheral pulses strong  ABDOMEN: soft, nontender, no hepatosplenomegaly, no masses and bowel sounds normal  MS: no gross musculoskeletal defects noted, no edema  SKIN: no suspicious lesions or rashes  NEURO: Normal strength and tone, mentation intact and speech normal  PSYCH: mentation appears normal, affect normal/bright    Diagnostic Test Results:  Labs reviewed in Epic    ASSESSMENT/PLAN:   Henna was seen today for physical and imm/inj.    Diagnoses and all orders for this visit:    Routine history and physical examination of adult  -     CBC with platelets and differential; Future  -     Basic metabolic panel  (Ca, Cl, CO2, Creat, Gluc, K, Na, BUN); Future  -     Hemoglobin A1c; Future  -     Lipid panel reflex to direct LDL Fasting; Future  -     TSH with free T4 reflex; Future    Dysuria  -     nitroFURantoin macrocrystal-monohydrate (MACROBID) 100 MG capsule; TAKE 1 CAPSULE (100 MG) EVERY 12 (TWELVE) HOURS FOR 7 DAYS.  -     phenazopyridine (PYRIDIUM) 200 MG tablet; Take 1 tablet (200 mg) by mouth 3 times daily as needed for irritation    Need for prophylactic vaccination and inoculation against influenza  -     INFLUENZA QUAD, RECOMBINANT, P-FREE (RIV4) (FLUBLOK)        Patient has been advised of split billing requirements and indicates understanding: Yes      COUNSELING:  Reviewed preventive health counseling, as reflected in patient instructions  Special attention given to:        Regular exercise       Healthy diet/nutrition        She reports that she has never smoked. She has never used smokeless tobacco.    Dary Singh MD  [facility]

## 2022-11-28 ENCOUNTER — TRANSFERRED RECORDS (OUTPATIENT)
Dept: HEALTH INFORMATION MANAGEMENT | Facility: CLINIC | Age: 52
End: 2022-11-28

## 2022-12-20 ENCOUNTER — LAB (OUTPATIENT)
Dept: LAB | Facility: CLINIC | Age: 52
End: 2022-12-20
Payer: COMMERCIAL

## 2022-12-20 DIAGNOSIS — Z00.00 ROUTINE HISTORY AND PHYSICAL EXAMINATION OF ADULT: ICD-10-CM

## 2022-12-20 LAB
ANION GAP SERPL CALCULATED.3IONS-SCNC: 13 MMOL/L (ref 7–15)
BASOPHILS # BLD AUTO: 0.1 10E3/UL (ref 0–0.2)
BASOPHILS NFR BLD AUTO: 1 %
BUN SERPL-MCNC: 14.7 MG/DL (ref 6–20)
CALCIUM SERPL-MCNC: 9.6 MG/DL (ref 8.6–10)
CHLORIDE SERPL-SCNC: 102 MMOL/L (ref 98–107)
CHOLEST SERPL-MCNC: 287 MG/DL
CREAT SERPL-MCNC: 0.84 MG/DL (ref 0.51–0.95)
DEPRECATED HCO3 PLAS-SCNC: 24 MMOL/L (ref 22–29)
EOSINOPHIL # BLD AUTO: 0.1 10E3/UL (ref 0–0.7)
EOSINOPHIL NFR BLD AUTO: 1 %
ERYTHROCYTE [DISTWIDTH] IN BLOOD BY AUTOMATED COUNT: 11.9 % (ref 10–15)
GFR SERPL CREATININE-BSD FRML MDRD: 83 ML/MIN/1.73M2
GLUCOSE SERPL-MCNC: 102 MG/DL (ref 70–99)
HBA1C MFR BLD: 5.5 % (ref 0–5.6)
HCT VFR BLD AUTO: 43.2 % (ref 35–47)
HDLC SERPL-MCNC: 99 MG/DL
HGB BLD-MCNC: 14 G/DL (ref 11.7–15.7)
IMM GRANULOCYTES # BLD: 0 10E3/UL
IMM GRANULOCYTES NFR BLD: 0 %
LDLC SERPL CALC-MCNC: 177 MG/DL
LYMPHOCYTES # BLD AUTO: 2.3 10E3/UL (ref 0.8–5.3)
LYMPHOCYTES NFR BLD AUTO: 39 %
MCH RBC QN AUTO: 31.7 PG (ref 26.5–33)
MCHC RBC AUTO-ENTMCNC: 32.4 G/DL (ref 31.5–36.5)
MCV RBC AUTO: 98 FL (ref 78–100)
MONOCYTES # BLD AUTO: 0.5 10E3/UL (ref 0–1.3)
MONOCYTES NFR BLD AUTO: 8 %
NEUTROPHILS # BLD AUTO: 3 10E3/UL (ref 1.6–8.3)
NEUTROPHILS NFR BLD AUTO: 51 %
NONHDLC SERPL-MCNC: 188 MG/DL
PLATELET # BLD AUTO: 249 10E3/UL (ref 150–450)
POTASSIUM SERPL-SCNC: 4.1 MMOL/L (ref 3.4–5.3)
RBC # BLD AUTO: 4.42 10E6/UL (ref 3.8–5.2)
SODIUM SERPL-SCNC: 139 MMOL/L (ref 136–145)
TRIGL SERPL-MCNC: 53 MG/DL
TSH SERPL DL<=0.005 MIU/L-ACNC: 1.75 UIU/ML (ref 0.3–4.2)
WBC # BLD AUTO: 5.9 10E3/UL (ref 4–11)

## 2022-12-20 PROCEDURE — 85025 COMPLETE CBC W/AUTO DIFF WBC: CPT

## 2022-12-20 PROCEDURE — 36415 COLL VENOUS BLD VENIPUNCTURE: CPT

## 2022-12-20 PROCEDURE — 83036 HEMOGLOBIN GLYCOSYLATED A1C: CPT

## 2022-12-20 PROCEDURE — 80048 BASIC METABOLIC PNL TOTAL CA: CPT

## 2022-12-20 PROCEDURE — 84443 ASSAY THYROID STIM HORMONE: CPT

## 2022-12-20 PROCEDURE — 80061 LIPID PANEL: CPT

## 2023-07-25 ENCOUNTER — OFFICE VISIT (OUTPATIENT)
Dept: OBGYN | Facility: CLINIC | Age: 53
End: 2023-07-25
Payer: COMMERCIAL

## 2023-07-25 VITALS
SYSTOLIC BLOOD PRESSURE: 108 MMHG | HEIGHT: 64 IN | DIASTOLIC BLOOD PRESSURE: 64 MMHG | WEIGHT: 132.6 LBS | BODY MASS INDEX: 22.64 KG/M2

## 2023-07-25 DIAGNOSIS — Z12.4 ENCOUNTER FOR SCREENING FOR CERVICAL CANCER: Primary | ICD-10-CM

## 2023-07-25 PROCEDURE — 99396 PREV VISIT EST AGE 40-64: CPT | Performed by: OBSTETRICS & GYNECOLOGY

## 2023-07-25 PROCEDURE — 87624 HPV HI-RISK TYP POOLED RSLT: CPT | Performed by: OBSTETRICS & GYNECOLOGY

## 2023-07-25 PROCEDURE — G0145 SCR C/V CYTO,THINLAYER,RESCR: HCPCS | Performed by: OBSTETRICS & GYNECOLOGY

## 2023-07-25 ASSESSMENT — ANXIETY QUESTIONNAIRES
3. WORRYING TOO MUCH ABOUT DIFFERENT THINGS: NOT AT ALL
5. BEING SO RESTLESS THAT IT IS HARD TO SIT STILL: NOT AT ALL
7. FEELING AFRAID AS IF SOMETHING AWFUL MIGHT HAPPEN: NOT AT ALL
1. FEELING NERVOUS, ANXIOUS, OR ON EDGE: NOT AT ALL
6. BECOMING EASILY ANNOYED OR IRRITABLE: NOT AT ALL
GAD7 TOTAL SCORE: 0
IF YOU CHECKED OFF ANY PROBLEMS ON THIS QUESTIONNAIRE, HOW DIFFICULT HAVE THESE PROBLEMS MADE IT FOR YOU TO DO YOUR WORK, TAKE CARE OF THINGS AT HOME, OR GET ALONG WITH OTHER PEOPLE: NOT DIFFICULT AT ALL
GAD7 TOTAL SCORE: 0
2. NOT BEING ABLE TO STOP OR CONTROL WORRYING: NOT AT ALL

## 2023-07-25 ASSESSMENT — PATIENT HEALTH QUESTIONNAIRE - PHQ9
5. POOR APPETITE OR OVEREATING: NOT AT ALL
SUM OF ALL RESPONSES TO PHQ QUESTIONS 1-9: 0

## 2023-07-25 NOTE — PROGRESS NOTES
Henna is a 53 year old  female who presents for annual exam.     Besides routine health maintenance, she has no other health concerns today .    HPI:  The patient's PCP is  Dary Singh MD.    Patient has no concerns today.      GYNECOLOGIC HISTORY:    No LMP recorded. (Menstrual status: IUD).    Her current contraception method is: IUD.  She  reports that she has never smoked. She has never used smokeless tobacco.    Patient is sexually active.  STD testing offered?  Declined  Last PHQ-9 score on record =       2023     9:07 AM   PHQ-9 SCORE   PHQ-9 Total Score 0     Last GAD7 score on record =       2023     9:07 AM   SEB-7 SCORE   Total Score 0     Alcohol Score = 3    HEALTH MAINTENANCE:  Cholesterol: (  Cholesterol   Date Value Ref Range Status   2022 287 (H) <200 mg/dL Final   2021 270 (H) <200 mg/dL Final   2016 216 (H) <200 mg/dL Final     Comment:     Desirable:       <200 mg/dl   2011 229 (H) 0 - 200 mg/dL Final     Comment:     LDL Cholesterol is the primary guide to therapy.   The NCEP recommends further evaluation of: patients with cholesterol greater   than 200 mg/dL if additional risk factors are present, cholesterol greater   than   240 mg/dL, triglycerides greater than 150 mg/dL, or HDL less than 40 mg/dL.      Last Mammo:  22 , Result: Normal, Next Mammo: routine   Pap: last PAP 10/25/19    Colonoscopy:  21, Result: Normal, Next Colonoscopy:    Dexa:  N/A    Health maintenance updated:  yes    HISTORY:  OB History    Para Term  AB Living   2 1 1 0 1 1   SAB IAB Ectopic Multiple Live Births   1 0 0 0 1      # Outcome Date GA Lbr Dmitri/2nd Weight Sex Delivery Anes PTL Lv   2 Term  40w0d    Vag-Spont   ERNESTINE   1 1995               Patient Active Problem List   Diagnosis    Anxiety state    CARDIOVASCULAR SCREENING; LDL GOAL LESS THAN 160    Migraines     Past Surgical History:   Procedure Laterality Date    COLONOSCOPY   "01/2021    CRYOCAUTERY OF CERVIX  2005     ENLARGE BREAST WITH IMPLANT  2004      Social History     Tobacco Use    Smoking status: Never    Smokeless tobacco: Never   Substance Use Topics    Alcohol use: Yes     Comment: occ.      Problem (# of Occurrences) Relation (Name,Age of Onset)    Diabetes (1) Maternal Grandfather (Grandrupert Todd)    Hypertension (1) Father (Dad)    Lipids (1) Father (Dad)    Breast Cancer (1) Mother (Mom, 50)    Prostate Cancer (1) Father (Dad)    Rheumatoid Arthritis (1) Brother    Ovarian Cancer (1) Maternal Grandmother (Grandcarolann Todd)    Kidney Cancer (1) Father (Dad)    Skin Cancer (2) Mother (Mom), Maternal Grandfather (Leyda Todd)              Current Outpatient Medications   Medication Sig    levonorgestrel (MIRENA) 20 MCG/24HR IUD 1 each by Intrauterine route once    ALPRAZolam (XANAX) 0.5 MG tablet Take 1 tablet (0.5 mg) by mouth 3 times daily as needed for anxiety (Patient not taking: Reported on 7/25/2023)    latanoprost (XALATAN) 0.005 % ophthalmic solution INSTILL 1 DROP AT NIGHT INTO BOTH EYES (Patient not taking: Reported on 7/25/2023)    nitroFURantoin macrocrystal-monohydrate (MACROBID) 100 MG capsule TAKE 1 CAPSULE (100 MG) EVERY 12 (TWELVE) HOURS FOR 7 DAYS. (Patient not taking: Reported on 7/25/2023)    phenazopyridine (PYRIDIUM) 200 MG tablet Take 1 tablet (200 mg) by mouth 3 times daily as needed for irritation (Patient not taking: Reported on 7/25/2023)     No current facility-administered medications for this visit.     Facility-Administered Medications Ordered in Other Visits   Medication    perflutren diluted in saline (DEFINITY) injection 4.5 mL     No Known Allergies    Past medical, surgical, social and family histories were reviewed and updated in EPIC.    ROS:   12 point review of systems negative other than symptoms noted below or in the HPI.    EXAM:  /64   Ht 1.625 m (5' 3.98\")   Wt 60.1 kg (132 lb 9.6 oz)   BMI 22.78 kg/m     BMI: Body " mass index is 22.78 kg/m .    PHYSICAL EXAM:  Constitutional:   Appearance: Well nourished, well developed, alert, in no acute distress  Neck:  Lymph Nodes:  No lymphadenopathy present    Thyroid:  Gland size normal, nontender, no nodules or masses present  on palpation  Chest:  Respiratory Effort:  Breathing unlabored  Cardiovascular:    Heart: Auscultation:  Regular rate, normal rhythm, no murmurs present  Breasts: Palpation of Breasts and Axillae:  No masses present on palpation, no breast tenderness., Axillary Lymph Nodes:  No lymphadenopathy present., and No nodularity, asymmetry or nipple discharge bilaterally.  Gastrointestinal:   Abdominal Examination:  Abdomen nontender to palpation, tone normal without rigidity or guarding, no masses present, umbilicus without lesions   Liver and Spleen:  No hepatomegaly present, liver nontender to palpation    Hernias:  No hernias present  Lymphatic: Lymph Nodes:  No other lymphadenopathy present  Skin:  General Inspection:  No rashes present, no lesions present, no areas of  discoloration  Neurologic:    Mental Status:  Oriented X3.  Normal strength and tone, sensory exam                grossly normal, mentation intact and speech normal.    Psychiatric:   Mentation appears normal and affect normal/bright.         Pelvic Exam:  External Genitalia:     Normal appearance for age, no discharge present, no tenderness present, no inflammatory lesions present, color normal  Vagina:     Normal vaginal vault without central or paravaginal defects, no discharge present, no inflammatory lesions present, no masses present  Bladder:     Nontender to palpation  Urethra:   Urethral Body:  Urethra palpation normal, urethra structural support normal   Urethral Meatus:  No erythema or lesions present  Cervix:     Appearance healthy, no lesions present, nontender to palpation, no bleeding present. IUD strings visualized. Pap collected  Uterus:     Uterus: firm, normal sized and nontender,  midplane in position.   Adnexa:     No adnexal tenderness present, no adnexal masses present  Perineum:     Perineum within normal limits, no evidence of trauma, no rashes or skin lesions present  Anus:     Anus within normal limits, no hemorrhoids present  Inguinal Lymph Nodes:     No lymphadenopathy present  Pubic Hair:     Normal pubic hair distribution for age  Genitalia and Groin:     No rashes present, no lesions present, no areas of discoloration, no masses present    COUNSELING:   Reviewed preventive health counseling, as reflected in patient instructions  Special attention given to:        Regular exercise       Healthy diet/nutrition       (Ivanna)menopause management    BMI: Body mass index is 22.78 kg/m .      ASSESSMENT:  53 year old female with satisfactory annual exam.    ICD-10-CM    1. Encounter for screening for cervical cancer  Z12.4 Pap thin layer screen with HPV - recommended age 30 - 65 years          PLAN:  Normal exam today  Pap collected  Health maintenance up-to-date  Mirena IUD due to be removed next year.    Follow-up in 1 year or sooner as needed.    Michelle Snider MD

## 2023-07-29 LAB
BKR LAB AP GYN ADEQUACY: NORMAL
BKR LAB AP GYN INTERPRETATION: NORMAL
BKR LAB AP HPV REFLEX: NORMAL
BKR LAB AP PREVIOUS ABNORMAL: NORMAL
PATH REPORT.COMMENTS IMP SPEC: NORMAL
PATH REPORT.COMMENTS IMP SPEC: NORMAL
PATH REPORT.RELEVANT HX SPEC: NORMAL

## 2023-08-01 ENCOUNTER — PATIENT OUTREACH (OUTPATIENT)
Dept: OBGYN | Facility: CLINIC | Age: 53
End: 2023-08-01
Payer: COMMERCIAL

## 2023-08-01 ENCOUNTER — LAB (OUTPATIENT)
Dept: LAB | Facility: CLINIC | Age: 53
End: 2023-08-01
Payer: COMMERCIAL

## 2023-08-01 ENCOUNTER — MYC MEDICAL ADVICE (OUTPATIENT)
Dept: OBGYN | Facility: CLINIC | Age: 53
End: 2023-08-01
Payer: COMMERCIAL

## 2023-08-01 DIAGNOSIS — Z13.1 SCREENING FOR DIABETES MELLITUS: ICD-10-CM

## 2023-08-01 DIAGNOSIS — E78.5 HYPERLIPIDEMIA LDL GOAL <100: ICD-10-CM

## 2023-08-01 LAB
CHOLEST SERPL-MCNC: 261 MG/DL
FASTING STATUS PATIENT QL REPORTED: YES
GLUCOSE SERPL-MCNC: 108 MG/DL (ref 70–99)
HBA1C MFR BLD: 5.4 % (ref 0–5.6)
HDLC SERPL-MCNC: 94 MG/DL
HUMAN PAPILLOMA VIRUS 16 DNA: NEGATIVE
HUMAN PAPILLOMA VIRUS 18 DNA: NEGATIVE
HUMAN PAPILLOMA VIRUS FINAL DIAGNOSIS: ABNORMAL
HUMAN PAPILLOMA VIRUS OTHER HR: POSITIVE
LDLC SERPL CALC-MCNC: 160 MG/DL
NONHDLC SERPL-MCNC: 167 MG/DL
TRIGL SERPL-MCNC: 35 MG/DL

## 2023-08-01 PROCEDURE — 36415 COLL VENOUS BLD VENIPUNCTURE: CPT

## 2023-08-01 PROCEDURE — 83036 HEMOGLOBIN GLYCOSYLATED A1C: CPT

## 2023-08-01 PROCEDURE — 80061 LIPID PANEL: CPT

## 2023-08-01 PROCEDURE — 82947 ASSAY GLUCOSE BLOOD QUANT: CPT

## 2024-01-18 ENCOUNTER — TRANSFERRED RECORDS (OUTPATIENT)
Dept: HEALTH INFORMATION MANAGEMENT | Facility: CLINIC | Age: 54
End: 2024-01-18
Payer: COMMERCIAL

## 2024-06-13 ENCOUNTER — E-VISIT (OUTPATIENT)
Dept: FAMILY MEDICINE | Facility: CLINIC | Age: 54
End: 2024-06-13
Payer: COMMERCIAL

## 2024-06-13 DIAGNOSIS — K12.0 CANKER SORE: Primary | ICD-10-CM

## 2024-06-13 DIAGNOSIS — B00.1 COLD SORE: ICD-10-CM

## 2024-06-13 PROCEDURE — 99423 OL DIG E/M SVC 21+ MIN: CPT | Performed by: INTERNAL MEDICINE

## 2024-06-13 RX ORDER — VALACYCLOVIR HYDROCHLORIDE 1 G/1
1000 TABLET, FILM COATED ORAL 2 TIMES DAILY
Qty: 20 TABLET | Refills: 2 | Status: SHIPPED | OUTPATIENT
Start: 2024-06-13 | End: 2024-07-29

## 2024-06-13 NOTE — TELEPHONE ENCOUNTER
Provider E-Visit time total (minutes): 22 minutes    Chief Complaint:     E-visit for cold sore, canker sore symptoms    HPI:   Patient Henna Higginbotham is a very pleasant 54 year old female who presents to Internal Medicine clinic today for E-visit for cold sore, canker sore symptoms.            Current Medications:     Current Outpatient Medications   Medication Sig Dispense Refill    Benzocaine 15 MG DISK Take 1 Application by mouth 3 times daily 20 each 1    valACYclovir (VALTREX) 1000 mg tablet Take 1 tablet (1,000 mg) by mouth 2 times daily for 10 days 20 tablet 2    ALPRAZolam (XANAX) 0.5 MG tablet Take 1 tablet (0.5 mg) by mouth 3 times daily as needed for anxiety (Patient not taking: Reported on 7/25/2023) 30 tablet 5    latanoprost (XALATAN) 0.005 % ophthalmic solution INSTILL 1 DROP AT NIGHT INTO BOTH EYES (Patient not taking: Reported on 7/25/2023)      levonorgestrel (MIRENA) 20 MCG/24HR IUD 1 each by Intrauterine route once      nitroFURantoin macrocrystal-monohydrate (MACROBID) 100 MG capsule TAKE 1 CAPSULE (100 MG) EVERY 12 (TWELVE) HOURS FOR 7 DAYS. (Patient not taking: Reported on 7/25/2023) 14 capsule 6    phenazopyridine (PYRIDIUM) 200 MG tablet Take 1 tablet (200 mg) by mouth 3 times daily as needed for irritation (Patient not taking: Reported on 7/25/2023) 30 tablet 5         Allergies:    No Known Allergies         Past Medical History:     Past Medical History:   Diagnosis Date    Anxiety state, unspecified     Migraines          Past Surgical History:     Past Surgical History:   Procedure Laterality Date    COLONOSCOPY  01/2021    CRYOCAUTERY OF CERVIX  2005    HC ENLARGE BREAST WITH IMPLANT  2004         Family Medical History:     Family History   Problem Relation Age of Onset    Breast Cancer Mother 50    Skin Cancer Mother     Kidney Cancer Father     Hypertension Father     Lipids Father     Prostate Cancer Father     Rheumatoid Arthritis Brother     Ovarian Cancer Maternal  Grandmother     Diabetes Maternal Grandfather     Skin Cancer Maternal Grandfather          Social History:     Social History     Socioeconomic History    Marital status:      Spouse name: Not on file    Number of children: Not on file    Years of education: Not on file    Highest education level: Not on file   Occupational History    Not on file   Tobacco Use    Smoking status: Never    Smokeless tobacco: Never   Substance and Sexual Activity    Alcohol use: Yes     Comment: occ.    Drug use: No    Sexual activity: Yes     Partners: Male     Birth control/protection: I.U.D.   Other Topics Concern    Parent/sibling w/ CABG, MI or angioplasty before 65F 55M? No   Social History Narrative    Not on file     Social Determinants of Health     Financial Resource Strain: Low Risk  (8/21/2023)    Received from [facility]    Overall Financial Resource Strain (CARDIA)     Difficulty of Paying Living Expenses: Not hard at all   Food Insecurity: No Food Insecurity (8/21/2023)    Received from [facility]    Hunger Vital Sign     Worried About Running Out of Food in the Last Year: Never true     Ran Out of Food in the Last Year: Never true   Transportation Needs: No Transportation Needs (8/21/2023)    Received from [facility]    PRAPARE - Transportation     Lack of Transportation (Medical): No     Lack of Transportation (Non-Medical): No   Physical Activity: Sufficiently Active (8/21/2023)    Received from [facility]    Exercise Vital Sign     Days of Exercise per Week: 4 days     Minutes of Exercise per Session: 60 min   Stress: Not on file   Social Connections: Not on file   Interpersonal Safety: Not At Risk (8/21/2023)    Received from [facility]    Humiliation, Afraid, Rape, and Kick questionnaire     Fear of Current or Ex-Partner: No     Emotionally Abused: No     Physically Abused: No     Sexually Abused: No   Housing Stability: Low Risk   (8/21/2023)    [facility]    Housing Stability     What is your living situation today?: I have a steady place to live           Review of System:     Constitutional: Negative for fever or chills  Skin: Negative for rashes  Ears/Nose/Throat: positive for cold sore, canker sore symptoms.    Respiratory: No shortness of breath, dyspnea on exertion, cough, or hemoptysis  Cardiovascular: Negative for chest pain  Gastrointestinal: Negative for nausea, vomiting  Genitourinary: Negative for dysuria, hematuria  Musculoskeletal: Negative for myalgias  Neurologic: Negative for headaches  Psychiatric: Negative for depression, anxiety  Hematologic/Lymphatic/Immunologic: Negative  Endocrine: Negative  Behavioral: Negative for tobacco use       Physical Exam:   There were no vitals taken for this visit.        Diagnostic Test Results:     Diagnostic Test Results:  Labs reviewed in Epic    ASSESSMENT/PLAN:       Marisol was seen today for derm problem.    Diagnoses and all orders for this visit:    Canker sore  -     Benzocaine 15 MG DISK; Take 1 Application by mouth 3 times daily    Cold sore  -     valACYclovir (VALTREX) 1000 mg tablet; Take 1 tablet (1,000 mg) by mouth 2 times daily for 10 days            Follow Up Plan:     Patient is instructed to return to Internal Medicine clinic for follow-up visit in 1 month.        Dary Singh MD  Internal Medicine  [facility]

## 2024-07-05 ENCOUNTER — PATIENT OUTREACH (OUTPATIENT)
Dept: OBGYN | Facility: CLINIC | Age: 54
End: 2024-07-05
Payer: COMMERCIAL

## 2024-07-05 PROBLEM — R87.810 CERVICAL HIGH RISK HPV (HUMAN PAPILLOMAVIRUS) TEST POSITIVE: Status: ACTIVE | Noted: 2023-07-25

## 2024-07-05 NOTE — TELEPHONE ENCOUNTER
Patient due for Pap and HPV.    Reminder done today via [employer].    7/29/24 visit scheduled- notes added

## 2024-07-29 ENCOUNTER — OFFICE VISIT (OUTPATIENT)
Dept: OBGYN | Facility: CLINIC | Age: 54
End: 2024-07-29
Payer: COMMERCIAL

## 2024-07-29 VITALS
SYSTOLIC BLOOD PRESSURE: 110 MMHG | WEIGHT: 137.6 LBS | BODY MASS INDEX: 23.49 KG/M2 | DIASTOLIC BLOOD PRESSURE: 62 MMHG | HEIGHT: 64 IN

## 2024-07-29 DIAGNOSIS — Z01.419 ENCOUNTER FOR WELL WOMAN EXAM WITH ROUTINE GYNECOLOGICAL EXAM: Primary | ICD-10-CM

## 2024-07-29 DIAGNOSIS — Z78.0 ASYMPTOMATIC MENOPAUSAL STATE: ICD-10-CM

## 2024-07-29 DIAGNOSIS — B97.7 HIGH RISK HUMAN PAPILLOMAVIRUS INFECTION: ICD-10-CM

## 2024-07-29 PROCEDURE — 87624 HPV HI-RISK TYP POOLED RSLT: CPT | Performed by: OBSTETRICS & GYNECOLOGY

## 2024-07-29 PROCEDURE — 99396 PREV VISIT EST AGE 40-64: CPT | Performed by: OBSTETRICS & GYNECOLOGY

## 2024-07-29 PROCEDURE — 88141 CYTOPATH C/V INTERPRET: CPT | Performed by: PATHOLOGY

## 2024-07-29 PROCEDURE — 88175 CYTOPATH C/V AUTO FLUID REDO: CPT | Performed by: OBSTETRICS & GYNECOLOGY

## 2024-07-29 PROCEDURE — 99459 PELVIC EXAMINATION: CPT | Performed by: OBSTETRICS & GYNECOLOGY

## 2024-07-29 ASSESSMENT — ANXIETY QUESTIONNAIRES
7. FEELING AFRAID AS IF SOMETHING AWFUL MIGHT HAPPEN: NOT AT ALL
GAD7 TOTAL SCORE: 0
GAD7 TOTAL SCORE: 0
6. BECOMING EASILY ANNOYED OR IRRITABLE: NOT AT ALL
1. FEELING NERVOUS, ANXIOUS, OR ON EDGE: NOT AT ALL
3. WORRYING TOO MUCH ABOUT DIFFERENT THINGS: NOT AT ALL
IF YOU CHECKED OFF ANY PROBLEMS ON THIS QUESTIONNAIRE, HOW DIFFICULT HAVE THESE PROBLEMS MADE IT FOR YOU TO DO YOUR WORK, TAKE CARE OF THINGS AT HOME, OR GET ALONG WITH OTHER PEOPLE: NOT DIFFICULT AT ALL
5. BEING SO RESTLESS THAT IT IS HARD TO SIT STILL: NOT AT ALL
2. NOT BEING ABLE TO STOP OR CONTROL WORRYING: NOT AT ALL

## 2024-07-29 ASSESSMENT — LIFESTYLE VARIABLES
HOW MANY STANDARD DRINKS CONTAINING ALCOHOL DO YOU HAVE ON A TYPICAL DAY: 1 OR 2
HOW OFTEN DO YOU HAVE A DRINK CONTAINING ALCOHOL: 2-3 TIMES A WEEK
HOW OFTEN DO YOU HAVE SIX OR MORE DRINKS ON ONE OCCASION: NEVER
SKIP TO QUESTIONS 9-10: 1
AUDIT-C TOTAL SCORE: 3

## 2024-07-29 ASSESSMENT — PATIENT HEALTH QUESTIONNAIRE - PHQ9
5. POOR APPETITE OR OVEREATING: NOT AT ALL
SUM OF ALL RESPONSES TO PHQ QUESTIONS 1-9: 0

## 2024-07-29 NOTE — PROGRESS NOTES
Henna is a 54 year old  female who presents for annual exam.     Besides routine health maintenance, she has no other health concerns today .    HPI:  The patient's PCP is  Dary Singh MD.    No concerns today.   Wondering about Dexa Scan. Would like to make sure she is doing what she can to help prevent bone loss.  Had Mirena place in 2016  IS due for repeat pap this year due to abnormal pap last year.    GYNECOLOGIC HISTORY:    No LMP recorded (lmp unknown). (Menstrual status: IUD).    Patient is sexually active.  Her current contraception method is: IUD.  STD testing offered?  Declined      She  reports that she has never smoked. She has never used smokeless tobacco.      Last PHQ-9 score on record =       2024     2:47 PM   PHQ-9 SCORE   PHQ-9 Total Score 0     Last GAD7 score on record =       2024     2:47 PM   SEB-7 SCORE   Total Score 0     Alcohol Score = 3    HEALTH MAINTENANCE:  Cholesterol:   Recent Labs   Lab Test 23  1053 22  1019   CHOL 261* 287*   HDL 94 99   * 177*   TRIG 35 53       Last Mammo:  24 , Result: Normal, Next Mammo: Routine Annually due 25  Colonoscopy:  Not Applicable , Result: Not applicable, Next Colonoscopy: Due/Overdue.  Dexa:  Due at age 64.    PAP/HPV HISTORY:   Lab Results   Component Value Date    PAP/HPV NILM; HPV: +Other (not 16/18) 2023      Health maintenance updated:  yes    HISTORY:  OB History    Para Term  AB Living   2 1 1 0 1 1   SAB IAB Ectopic Multiple Live Births   1 0 0 0 1      # Outcome Date GA Lbr Dmitri/2nd Weight Sex Type Anes PTL Lv   2 Term  40w0d    Vag-Spont   ERNESTINE   1 SAB                Patient Active Problem List   Diagnosis    Anxiety state    CARDIOVASCULAR SCREENING; LDL GOAL LESS THAN 160    Migraines    Cervical high risk HPV (human papillomavirus) test positive     Past Surgical History:   Procedure Laterality Date    COLONOSCOPY  2021    CRYOCAUTERY OF CERVIX    "   HC ENLARGE BREAST WITH IMPLANT  2004      Social History     Tobacco Use    Smoking status: Never    Smokeless tobacco: Never   Substance Use Topics    Alcohol use: Yes     Comment: occ.      Problem (# of Occurrences) Relation (Name,Age of Onset)    Diabetes (1) Maternal Grandfather (Grandpa Perez)    Hypertension (1) Father (Dad)    Lipids (1) Father (Dad)    Breast Cancer (1) Mother (Mom, 50)    Prostate Cancer (1) Father (Dad)    Rheumatoid Arthritis (1) Brother    Ovarian Cancer (1) Maternal Grandmother (Grandcarolann Todd)    Kidney Cancer (1) Father (Dad)    Skin Cancer (2) Mother (Mom), Maternal Grandfather (Grandpa Perez)              Current Outpatient Medications   Medication Sig Dispense Refill    levonorgestrel (MIRENA) 20 MCG/24HR IUD 1 each by Intrauterine route once       No current facility-administered medications for this visit.     Facility-Administered Medications Ordered in Other Visits   Medication Dose Route Frequency Provider Last Rate Last Admin    perflutren diluted in saline (DEFINITY) injection 4.5 mL  4.5 mL Intravenous Once Kehr, Kristen M, PA-C         No Known Allergies    Past medical, surgical, social and family histories were reviewed and updated in EPIC.    EXAM:  /62   Ht 1.625 m (5' 3.98\")   Wt 62.4 kg (137 lb 9.6 oz)   LMP  (LMP Unknown)   BMI 23.63 kg/m     BMI: Body mass index is 23.63 kg/m .    PHYSICAL EXAM:  Constitutional:   Appearance: Well nourished, well developed, alert, in no acute distress  Neck:  Lymph Nodes:  No lymphadenopathy present    Thyroid:  Gland size normal, nontender, no nodules or masses present  on palpation  Chest:  Respiratory Effort:  Breathing unlabored  Cardiovascular:    Heart: Auscultation:  Regular rate, normal rhythm, no murmurs present  Breasts: Palpation of Breasts and Axillae:  No masses present on palpation, no breast tenderness., Axillary Lymph Nodes:  No lymphadenopathy present., and No nodularity, asymmetry or nipple " discharge bilaterally.  Gastrointestinal:   Abdominal Examination:  Abdomen nontender to palpation, tone normal without rigidity or guarding, no masses present, umbilicus without lesions   Liver and Spleen:  No hepatomegaly present, liver nontender to palpation    Hernias:  No hernias present  Lymphatic: Lymph Nodes:  No other lymphadenopathy present  Skin:  General Inspection:  No rashes present, no lesions present, no areas of  discoloration  Neurologic:    Mental Status:  Oriented X3.  Normal strength and tone, sensory exam                grossly normal, mentation intact and speech normal.    Psychiatric:   Mentation appears normal and affect normal/bright.         Pelvic Exam:  External Genitalia:     Normal appearance for age, no discharge present, no tenderness present, no inflammatory lesions present, color normal  Vagina:     Normal vaginal vault without central or paravaginal defects, no discharge present, no inflammatory lesions present, no masses present  Bladder:     Nontender to palpation  Urethra:   Urethral Body:  Urethra palpation normal, urethra structural support normal   Urethral Meatus:  No erythema or lesions present  Cervix:     Appearance healthy, no lesions present, nontender to palpation, no bleeding present. IUD strings visualized. Pap collected  Uterus:     Uterus: firm, normal sized and nontender, midplane in position.   Adnexa:     No adnexal tenderness present, no adnexal masses present  Perineum:     Perineum within normal limits, no evidence of trauma, no rashes or skin lesions present  Anus:     Anus within normal limits, no hemorrhoids present  Inguinal Lymph Nodes:     No lymphadenopathy present  Pubic Hair:     Normal pubic hair distribution for age  Genitalia and Groin:     No rashes present, no lesions present, no areas of discoloration, no masses present    COUNSELING:   Reviewed preventive health counseling, as reflected in patient instructions  Special attention given to:         Regular exercise       Healthy diet/nutrition       Osteoporosis prevention/bone health       (Ivanna)menopause management    BMI: Body mass index is 23.63 kg/m .      ASSESSMENT:  54 year old female with satisfactory annual exam.    ICD-10-CM    1. Encounter for well woman exam with routine gynecological exam  Z01.419 DC PELVIC EXAMINATION      2. High risk human papillomavirus infection  B97.7 Gynecologic Cytology (Pap) and HPV      3. Asymptomatic menopausal state  Z78.0 DX Bone Density          PLAN:  -Normal exam today  -Pap today. Follow-up per ASCCP guidelines  -Discussed Mirena IUD. Reasonable to leave in until next year. At that point recommend removal.  -Discussed BMD scanning. Dexa order placed. Dicussed osteoporosis prevention.     Follow-up in 1 year for annual or sooner prn    Michelle Snider MD

## 2024-08-01 LAB
HPV HR 12 DNA CVX QL NAA+PROBE: NEGATIVE
HPV16 DNA CVX QL NAA+PROBE: NEGATIVE
HPV18 DNA CVX QL NAA+PROBE: NEGATIVE
HUMAN PAPILLOMA VIRUS FINAL DIAGNOSIS: NORMAL

## 2024-08-06 LAB
BKR LAB AP GYN ADEQUACY: ABNORMAL
BKR LAB AP GYN INTERPRETATION: ABNORMAL
BKR LAB AP PREVIOUS ABNL DX: ABNORMAL
BKR LAB AP PREVIOUS ABNORMAL: ABNORMAL
PATH REPORT.COMMENTS IMP SPEC: ABNORMAL
PATH REPORT.COMMENTS IMP SPEC: ABNORMAL
PATH REPORT.RELEVANT HX SPEC: ABNORMAL

## 2024-08-07 ENCOUNTER — MYC MEDICAL ADVICE (OUTPATIENT)
Dept: OBGYN | Facility: CLINIC | Age: 54
End: 2024-08-07
Payer: COMMERCIAL

## 2024-08-07 ENCOUNTER — PATIENT OUTREACH (OUTPATIENT)
Dept: OBGYN | Facility: CLINIC | Age: 54
End: 2024-08-07
Payer: COMMERCIAL

## 2024-08-07 NOTE — TELEPHONE ENCOUNTER
Pt notified of abnormal PAP results  Pt thought you would require further evaluation based on your discussion with her.    7/29/24: Agatha  IS due for repeat pap this year due to abnormal pap last year   -Pap today. Follow-up per ASCCP guidelines          Component  Ref Range & Units  Resulting Agency   Interpretation    Abnormal   Atypical squamous cells of undetermined significance (ASC-US)          Component  Ref Range & Units  Resulting Agency     Human Papilloma Virus 16 DNA  Negative Negative [facility]    Human Papilloma Virus 18 DNA  Negative Negative [facility]    Human Papilloma Virus Other  Negative Negative [facility]    FINAL DIAGNOSIS  [facility]   This patient's sample is negative for high risk HPV DNA.       Routing pt [employer] message to provider to advise.  Aydin Mckeon RN on 8/7/2024 at 1:04 PM   [facility]